# Patient Record
Sex: FEMALE | Race: WHITE | NOT HISPANIC OR LATINO | Employment: FULL TIME | URBAN - METROPOLITAN AREA
[De-identification: names, ages, dates, MRNs, and addresses within clinical notes are randomized per-mention and may not be internally consistent; named-entity substitution may affect disease eponyms.]

---

## 2021-12-10 ENCOUNTER — PRE-ADMISSION TESTING (OUTPATIENT)
Dept: PREADMISSION TESTING | Facility: HOSPITAL | Age: 56
End: 2021-12-10

## 2021-12-10 VITALS — BODY MASS INDEX: 30.43 KG/M2 | WEIGHT: 171.74 LBS | HEIGHT: 63 IN

## 2021-12-10 LAB
ANION GAP SERPL CALCULATED.3IONS-SCNC: 15 MMOL/L (ref 5–15)
BUN SERPL-MCNC: 13 MG/DL (ref 6–20)
BUN/CREAT SERPL: 19.7 (ref 7–25)
CALCIUM SPEC-SCNC: 10.8 MG/DL (ref 8.6–10.5)
CHLORIDE SERPL-SCNC: 101 MMOL/L (ref 98–107)
CO2 SERPL-SCNC: 25 MMOL/L (ref 22–29)
CREAT SERPL-MCNC: 0.66 MG/DL (ref 0.57–1)
DEPRECATED RDW RBC AUTO: 43.8 FL (ref 37–54)
ERYTHROCYTE [DISTWIDTH] IN BLOOD BY AUTOMATED COUNT: 12.7 % (ref 12.3–15.4)
GFR SERPL CREATININE-BSD FRML MDRD: 112 ML/MIN/1.73
GFR SERPL CREATININE-BSD FRML MDRD: 92 ML/MIN/1.73
GLUCOSE SERPL-MCNC: 131 MG/DL (ref 65–99)
HBA1C MFR BLD: 6.5 % (ref 4.8–5.6)
HCT VFR BLD AUTO: 46.8 % (ref 34–46.6)
HGB BLD-MCNC: 15.8 G/DL (ref 12–15.9)
MCH RBC QN AUTO: 31.6 PG (ref 26.6–33)
MCHC RBC AUTO-ENTMCNC: 33.8 G/DL (ref 31.5–35.7)
MCV RBC AUTO: 93.6 FL (ref 79–97)
PLATELET # BLD AUTO: 323 10*3/MM3 (ref 140–450)
PMV BLD AUTO: 9.2 FL (ref 6–12)
POTASSIUM SERPL-SCNC: 4.2 MMOL/L (ref 3.5–5.2)
QT INTERVAL: 414 MS
QTC INTERVAL: 462 MS
RBC # BLD AUTO: 5 10*6/MM3 (ref 3.77–5.28)
SARS-COV-2 RNA PNL SPEC NAA+PROBE: NOT DETECTED
SODIUM SERPL-SCNC: 141 MMOL/L (ref 136–145)
WBC NRBC COR # BLD: 9.96 10*3/MM3 (ref 3.4–10.8)

## 2021-12-10 PROCEDURE — U0004 COV-19 TEST NON-CDC HGH THRU: HCPCS

## 2021-12-10 PROCEDURE — 84134 ASSAY OF PREALBUMIN: CPT | Performed by: PLASTIC SURGERY

## 2021-12-10 PROCEDURE — 93005 ELECTROCARDIOGRAM TRACING: CPT

## 2021-12-10 PROCEDURE — 85027 COMPLETE CBC AUTOMATED: CPT

## 2021-12-10 PROCEDURE — C9803 HOPD COVID-19 SPEC COLLECT: HCPCS

## 2021-12-10 PROCEDURE — 83036 HEMOGLOBIN GLYCOSYLATED A1C: CPT

## 2021-12-10 PROCEDURE — 80048 BASIC METABOLIC PNL TOTAL CA: CPT

## 2021-12-10 PROCEDURE — 36415 COLL VENOUS BLD VENIPUNCTURE: CPT

## 2021-12-10 PROCEDURE — 93010 ELECTROCARDIOGRAM REPORT: CPT | Performed by: INTERNAL MEDICINE

## 2021-12-10 RX ORDER — ROSUVASTATIN CALCIUM 20 MG/1
20 TABLET, COATED ORAL DAILY
COMMUNITY

## 2021-12-10 RX ORDER — MULTIPLE VITAMINS W/ MINERALS TAB 9MG-400MCG
2 TAB ORAL DAILY
COMMUNITY

## 2021-12-10 RX ORDER — FAMOTIDINE 10 MG
10 TABLET ORAL 2 TIMES DAILY
COMMUNITY

## 2021-12-10 RX ORDER — HYDROCHLOROTHIAZIDE 25 MG/1
25 TABLET ORAL DAILY
COMMUNITY

## 2021-12-10 RX ORDER — OMEPRAZOLE 20 MG/1
20 CAPSULE, DELAYED RELEASE ORAL DAILY
Status: ON HOLD | COMMUNITY
End: 2021-12-13

## 2021-12-10 RX ORDER — AMLODIPINE BESYLATE 10 MG/1
10 TABLET ORAL DAILY
COMMUNITY

## 2021-12-10 NOTE — PAT
Patient viewed general [facility] education video as instructed in their preoperative information received from their surgeon.  Patient stated the general [facility] education video was viewed in its entirety and survey completed.  Copies of [facility] general education handouts (Incentive Spirometry, Meds to Beds Program, Patient Belongings, Pre-op skin preparation instructions, Blood Glucose testing, Visitor policy, Surgery FAQ, Code H) distributed to patient if not printed. Education related to the PAT pass and skin preparation for surgery (if applicable) completed in PAT as a reinforcement to PAT education video. Patient instructed to return PAT pass provided today as well as completed skin preparation sheet (if applicable) on the day of procedure.     Additionally if patient had not viewed video yet but intended to view it at home or in our waiting area, then referred them to the handout with QR code/link provided during PAT visit.  Instructed patient to complete survey after viewing the video in its entirety.  Encouraged patient/family to read [facility] general education handouts thoroughly and notify PAT staff with any questions or concerns. Patient verbalized understanding of all information and priority content.    Patient to apply Chlorhexadine wipes  to surgical area (as instructed) the night before procedure and the AM of procedure. Wipes provided.    Patient instructed to drink 20 ounces (or until full) of Gatorade and it needs to be completed 1 hour (for Main OR patients) or 2 hours (scheduled  section patients) before given arrival time for procedure (NO RED Gatorade)    Patient verbalized understanding.    COVID TEST PERFORMED IN [facility] TODAY

## 2021-12-11 ENCOUNTER — ANESTHESIA EVENT (OUTPATIENT)
Dept: PERIOP | Facility: HOSPITAL | Age: 56
End: 2021-12-11

## 2021-12-11 LAB — PREALB SERPL-MCNC: 32.7 MG/DL (ref 20–40)

## 2021-12-12 RX ORDER — FAMOTIDINE 10 MG/ML
20 INJECTION, SOLUTION INTRAVENOUS ONCE
Status: CANCELLED | OUTPATIENT
Start: 2021-12-12 | End: 2021-12-12

## 2021-12-13 ENCOUNTER — HOSPITAL ENCOUNTER (OUTPATIENT)
Facility: HOSPITAL | Age: 56
Discharge: HOME | End: 2021-12-14
Attending: PLASTIC SURGERY | Admitting: PLASTIC SURGERY

## 2021-12-13 ENCOUNTER — ANESTHESIA (OUTPATIENT)
Dept: PERIOP | Facility: HOSPITAL | Age: 56
End: 2021-12-13

## 2021-12-13 ENCOUNTER — ANESTHESIA EVENT CONVERTED (OUTPATIENT)
Dept: ANESTHESIOLOGY | Facility: HOSPITAL | Age: 56
End: 2021-12-13

## 2021-12-13 PROBLEM — L98.7 EXCESS SKIN OF ABDOMEN: Status: ACTIVE | Noted: 2021-12-13

## 2021-12-13 LAB
GLUCOSE BLDC GLUCOMTR-MCNC: 106 MG/DL (ref 70–130)
GLUCOSE BLDC GLUCOMTR-MCNC: 142 MG/DL (ref 70–130)
GLUCOSE BLDC GLUCOMTR-MCNC: 164 MG/DL (ref 70–130)
GLUCOSE BLDC GLUCOMTR-MCNC: 180 MG/DL (ref 70–130)

## 2021-12-13 PROCEDURE — G0378 HOSPITAL OBSERVATION PER HR: HCPCS

## 2021-12-13 PROCEDURE — 25010000002 FENTANYL CITRATE (PF) 50 MCG/ML SOLUTION

## 2021-12-13 PROCEDURE — 25010000002 ONDANSETRON PER 1 MG: Performed by: NURSE ANESTHETIST, CERTIFIED REGISTERED

## 2021-12-13 PROCEDURE — 82962 GLUCOSE BLOOD TEST: CPT

## 2021-12-13 PROCEDURE — C1889 IMPLANT/INSERT DEVICE, NOC: HCPCS | Performed by: PLASTIC SURGERY

## 2021-12-13 PROCEDURE — 63710000001 INSULIN LISPRO (HUMAN) PER 5 UNITS: Performed by: PLASTIC SURGERY

## 2021-12-13 PROCEDURE — 94799 UNLISTED PULMONARY SVC/PX: CPT

## 2021-12-13 PROCEDURE — 0 CEFAZOLIN IN DEXTROSE 2-4 GM/100ML-% SOLUTION: Performed by: PLASTIC SURGERY

## 2021-12-13 PROCEDURE — 25010000002 DEXAMETHASONE PER 1 MG: Performed by: NURSE ANESTHETIST, CERTIFIED REGISTERED

## 2021-12-13 PROCEDURE — 25010000002 ONDANSETRON PER 1 MG: Performed by: PLASTIC SURGERY

## 2021-12-13 PROCEDURE — 25010000002 DEXAMETHASONE SODIUM PHOSPHATE 10 MG/ML SOLUTION: Performed by: NURSE ANESTHETIST, CERTIFIED REGISTERED

## 2021-12-13 PROCEDURE — 25010000002 NEOSTIGMINE 10 MG/10ML SOLUTION: Performed by: NURSE ANESTHETIST, CERTIFIED REGISTERED

## 2021-12-13 PROCEDURE — 25010000002 PROPOFOL 10 MG/ML EMULSION: Performed by: NURSE ANESTHETIST, CERTIFIED REGISTERED

## 2021-12-13 PROCEDURE — 63710000001 PROMETHAZINE PER 12.5 MG: Performed by: PLASTIC SURGERY

## 2021-12-13 PROCEDURE — 25010000002 FENTANYL CITRATE (PF) 50 MCG/ML SOLUTION: Performed by: NURSE ANESTHETIST, CERTIFIED REGISTERED

## 2021-12-13 PROCEDURE — 25010000002 BUPRENORPHINE PER 0.1 MG: Performed by: NURSE ANESTHETIST, CERTIFIED REGISTERED

## 2021-12-13 PROCEDURE — 25010000002 EPINEPHRINE PER 0.1 MG: Performed by: PLASTIC SURGERY

## 2021-12-13 DEVICE — DEV CONTRL TISS STRATAFIX SPIRAL PDS PLS CT1 2-0 45CM: Type: IMPLANTABLE DEVICE | Site: ABDOMEN | Status: FUNCTIONAL

## 2021-12-13 RX ORDER — DIPHENHYDRAMINE HCL 25 MG
25 CAPSULE ORAL EVERY 6 HOURS PRN
Status: DISCONTINUED | OUTPATIENT
Start: 2021-12-13 | End: 2021-12-14 | Stop reason: HOSPADM

## 2021-12-13 RX ORDER — FENTANYL CITRATE 50 UG/ML
50 INJECTION, SOLUTION INTRAMUSCULAR; INTRAVENOUS
Status: DISCONTINUED | OUTPATIENT
Start: 2021-12-13 | End: 2021-12-13 | Stop reason: HOSPADM

## 2021-12-13 RX ORDER — SODIUM CHLORIDE, SODIUM LACTATE, POTASSIUM CHLORIDE, CALCIUM CHLORIDE 600; 310; 30; 20 MG/100ML; MG/100ML; MG/100ML; MG/100ML
75 INJECTION, SOLUTION INTRAVENOUS CONTINUOUS
Status: DISCONTINUED | OUTPATIENT
Start: 2021-12-13 | End: 2021-12-13

## 2021-12-13 RX ORDER — DEXAMETHASONE SODIUM PHOSPHATE 10 MG/ML
INJECTION, SOLUTION INTRAMUSCULAR; INTRAVENOUS
Status: COMPLETED | OUTPATIENT
Start: 2021-12-13 | End: 2021-12-13

## 2021-12-13 RX ORDER — NEOSTIGMINE METHYLSULFATE 1 MG/ML
INJECTION, SOLUTION INTRAVENOUS AS NEEDED
Status: DISCONTINUED | OUTPATIENT
Start: 2021-12-13 | End: 2021-12-13 | Stop reason: SURG

## 2021-12-13 RX ORDER — PROMETHAZINE HYDROCHLORIDE 25 MG/1
25 TABLET ORAL ONCE AS NEEDED
Status: DISCONTINUED | OUTPATIENT
Start: 2021-12-13 | End: 2021-12-13 | Stop reason: HOSPADM

## 2021-12-13 RX ORDER — DEXAMETHASONE SODIUM PHOSPHATE 4 MG/ML
INJECTION, SOLUTION INTRA-ARTICULAR; INTRALESIONAL; INTRAMUSCULAR; INTRAVENOUS; SOFT TISSUE AS NEEDED
Status: DISCONTINUED | OUTPATIENT
Start: 2021-12-13 | End: 2021-12-13 | Stop reason: SURG

## 2021-12-13 RX ORDER — DIPHENHYDRAMINE HYDROCHLORIDE 50 MG/ML
12.5 INJECTION INTRAMUSCULAR; INTRAVENOUS EVERY 6 HOURS PRN
Status: DISCONTINUED | OUTPATIENT
Start: 2021-12-13 | End: 2021-12-14 | Stop reason: HOSPADM

## 2021-12-13 RX ORDER — CEFAZOLIN SODIUM 2 G/100ML
2 INJECTION, SOLUTION INTRAVENOUS EVERY 8 HOURS
Status: DISCONTINUED | OUTPATIENT
Start: 2021-12-13 | End: 2021-12-13

## 2021-12-13 RX ORDER — DROPERIDOL 2.5 MG/ML
0.62 INJECTION, SOLUTION INTRAMUSCULAR; INTRAVENOUS ONCE AS NEEDED
Status: DISCONTINUED | OUTPATIENT
Start: 2021-12-13 | End: 2021-12-13 | Stop reason: HOSPADM

## 2021-12-13 RX ORDER — ROCURONIUM BROMIDE 10 MG/ML
INJECTION, SOLUTION INTRAVENOUS AS NEEDED
Status: DISCONTINUED | OUTPATIENT
Start: 2021-12-13 | End: 2021-12-13 | Stop reason: SURG

## 2021-12-13 RX ORDER — MULTIPLE VITAMINS W/ MINERALS TAB 9MG-400MCG
2 TAB ORAL DAILY
Status: DISCONTINUED | OUTPATIENT
Start: 2021-12-13 | End: 2021-12-14 | Stop reason: HOSPADM

## 2021-12-13 RX ORDER — FENTANYL CITRATE 50 UG/ML
INJECTION, SOLUTION INTRAMUSCULAR; INTRAVENOUS
Status: COMPLETED
Start: 2021-12-13 | End: 2021-12-13

## 2021-12-13 RX ORDER — LABETALOL HYDROCHLORIDE 5 MG/ML
INJECTION, SOLUTION INTRAVENOUS AS NEEDED
Status: DISCONTINUED | OUTPATIENT
Start: 2021-12-13 | End: 2021-12-13 | Stop reason: SURG

## 2021-12-13 RX ORDER — ROSUVASTATIN CALCIUM 20 MG/1
20 TABLET, COATED ORAL NIGHTLY
Status: DISCONTINUED | OUTPATIENT
Start: 2021-12-13 | End: 2021-12-14 | Stop reason: HOSPADM

## 2021-12-13 RX ORDER — PROMETHAZINE HYDROCHLORIDE 25 MG/1
25 SUPPOSITORY RECTAL ONCE AS NEEDED
Status: DISCONTINUED | OUTPATIENT
Start: 2021-12-13 | End: 2021-12-13 | Stop reason: HOSPADM

## 2021-12-13 RX ORDER — FENTANYL CITRATE 50 UG/ML
INJECTION, SOLUTION INTRAMUSCULAR; INTRAVENOUS AS NEEDED
Status: DISCONTINUED | OUTPATIENT
Start: 2021-12-13 | End: 2021-12-13 | Stop reason: SURG

## 2021-12-13 RX ORDER — SODIUM CHLORIDE 0.9 % (FLUSH) 0.9 %
10 SYRINGE (ML) INJECTION EVERY 12 HOURS SCHEDULED
Status: DISCONTINUED | OUTPATIENT
Start: 2021-12-13 | End: 2021-12-13 | Stop reason: HOSPADM

## 2021-12-13 RX ORDER — ACETAMINOPHEN 500 MG
1000 TABLET ORAL EVERY 6 HOURS
Status: DISCONTINUED | OUTPATIENT
Start: 2021-12-13 | End: 2021-12-14 | Stop reason: HOSPADM

## 2021-12-13 RX ORDER — HEPARIN SODIUM 5000 [USP'U]/ML
5000 INJECTION, SOLUTION INTRAVENOUS; SUBCUTANEOUS ONCE
Status: DISCONTINUED | OUTPATIENT
Start: 2021-12-13 | End: 2021-12-13 | Stop reason: HOSPADM

## 2021-12-13 RX ORDER — BUPRENORPHINE HYDROCHLORIDE 0.32 MG/ML
INJECTION INTRAMUSCULAR; INTRAVENOUS
Status: COMPLETED | OUTPATIENT
Start: 2021-12-13 | End: 2021-12-13

## 2021-12-13 RX ORDER — SODIUM CHLORIDE 9 MG/ML
75 INJECTION, SOLUTION INTRAVENOUS CONTINUOUS
Status: DISCONTINUED | OUTPATIENT
Start: 2021-12-13 | End: 2021-12-13

## 2021-12-13 RX ORDER — DEXTROSE MONOHYDRATE 25 G/50ML
25 INJECTION, SOLUTION INTRAVENOUS
Status: DISCONTINUED | OUTPATIENT
Start: 2021-12-13 | End: 2021-12-14 | Stop reason: HOSPADM

## 2021-12-13 RX ORDER — HYDROMORPHONE HYDROCHLORIDE 1 MG/ML
0.5 INJECTION, SOLUTION INTRAMUSCULAR; INTRAVENOUS; SUBCUTANEOUS
Status: DISCONTINUED | OUTPATIENT
Start: 2021-12-13 | End: 2021-12-13 | Stop reason: HOSPADM

## 2021-12-13 RX ORDER — LIDOCAINE HYDROCHLORIDE 10 MG/ML
INJECTION, SOLUTION EPIDURAL; INFILTRATION; INTRACAUDAL; PERINEURAL AS NEEDED
Status: DISCONTINUED | OUTPATIENT
Start: 2021-12-13 | End: 2021-12-13 | Stop reason: SURG

## 2021-12-13 RX ORDER — FAMOTIDINE 20 MG/1
10 TABLET, FILM COATED ORAL 2 TIMES DAILY
Status: DISCONTINUED | OUTPATIENT
Start: 2021-12-13 | End: 2021-12-14 | Stop reason: HOSPADM

## 2021-12-13 RX ORDER — CEPHALEXIN 250 MG/1
500 CAPSULE ORAL EVERY 12 HOURS SCHEDULED
Status: DISCONTINUED | OUTPATIENT
Start: 2021-12-13 | End: 2021-12-14 | Stop reason: HOSPADM

## 2021-12-13 RX ORDER — DROPERIDOL 2.5 MG/ML
0.62 INJECTION, SOLUTION INTRAMUSCULAR; INTRAVENOUS AS NEEDED
Status: DISCONTINUED | OUTPATIENT
Start: 2021-12-13 | End: 2021-12-13 | Stop reason: HOSPADM

## 2021-12-13 RX ORDER — HYDROCHLOROTHIAZIDE 25 MG/1
25 TABLET ORAL DAILY
Status: DISCONTINUED | OUTPATIENT
Start: 2021-12-13 | End: 2021-12-14 | Stop reason: HOSPADM

## 2021-12-13 RX ORDER — OXYCODONE HYDROCHLORIDE 5 MG/1
5 TABLET ORAL EVERY 4 HOURS PRN
Status: DISCONTINUED | OUTPATIENT
Start: 2021-12-13 | End: 2021-12-14 | Stop reason: HOSPADM

## 2021-12-13 RX ORDER — MIDAZOLAM HYDROCHLORIDE 1 MG/ML
1 INJECTION INTRAMUSCULAR; INTRAVENOUS
Status: DISCONTINUED | OUTPATIENT
Start: 2021-12-13 | End: 2021-12-13 | Stop reason: HOSPADM

## 2021-12-13 RX ORDER — VALSARTAN 160 MG/1
320 TABLET ORAL
Status: DISCONTINUED | OUTPATIENT
Start: 2021-12-13 | End: 2021-12-14 | Stop reason: HOSPADM

## 2021-12-13 RX ORDER — PROPOFOL 10 MG/ML
VIAL (ML) INTRAVENOUS AS NEEDED
Status: DISCONTINUED | OUTPATIENT
Start: 2021-12-13 | End: 2021-12-13 | Stop reason: SURG

## 2021-12-13 RX ORDER — ONDANSETRON 2 MG/ML
4 INJECTION INTRAMUSCULAR; INTRAVENOUS ONCE AS NEEDED
Status: DISCONTINUED | OUTPATIENT
Start: 2021-12-13 | End: 2021-12-13 | Stop reason: HOSPADM

## 2021-12-13 RX ORDER — IPRATROPIUM BROMIDE AND ALBUTEROL SULFATE 2.5; .5 MG/3ML; MG/3ML
3 SOLUTION RESPIRATORY (INHALATION) ONCE AS NEEDED
Status: DISCONTINUED | OUTPATIENT
Start: 2021-12-13 | End: 2021-12-13 | Stop reason: HOSPADM

## 2021-12-13 RX ORDER — SODIUM CHLORIDE 0.9 % (FLUSH) 0.9 %
3 SYRINGE (ML) INJECTION EVERY 12 HOURS SCHEDULED
Status: DISCONTINUED | OUTPATIENT
Start: 2021-12-13 | End: 2021-12-13 | Stop reason: HOSPADM

## 2021-12-13 RX ORDER — HYDROCODONE BITARTRATE AND ACETAMINOPHEN 5; 325 MG/1; MG/1
1 TABLET ORAL ONCE AS NEEDED
Status: DISCONTINUED | OUTPATIENT
Start: 2021-12-13 | End: 2021-12-13 | Stop reason: HOSPADM

## 2021-12-13 RX ORDER — NALOXONE HCL 0.4 MG/ML
0.4 VIAL (ML) INJECTION AS NEEDED
Status: DISCONTINUED | OUTPATIENT
Start: 2021-12-13 | End: 2021-12-13 | Stop reason: HOSPADM

## 2021-12-13 RX ORDER — MEPERIDINE HYDROCHLORIDE 25 MG/ML
12.5 INJECTION INTRAMUSCULAR; INTRAVENOUS; SUBCUTANEOUS
Status: DISCONTINUED | OUTPATIENT
Start: 2021-12-13 | End: 2021-12-13 | Stop reason: HOSPADM

## 2021-12-13 RX ORDER — HYDRALAZINE HYDROCHLORIDE 20 MG/ML
5 INJECTION INTRAMUSCULAR; INTRAVENOUS
Status: DISCONTINUED | OUTPATIENT
Start: 2021-12-13 | End: 2021-12-13 | Stop reason: HOSPADM

## 2021-12-13 RX ORDER — ONDANSETRON 2 MG/ML
INJECTION INTRAMUSCULAR; INTRAVENOUS AS NEEDED
Status: DISCONTINUED | OUTPATIENT
Start: 2021-12-13 | End: 2021-12-13 | Stop reason: SURG

## 2021-12-13 RX ORDER — SODIUM CHLORIDE, SODIUM LACTATE, POTASSIUM CHLORIDE, CALCIUM CHLORIDE 600; 310; 30; 20 MG/100ML; MG/100ML; MG/100ML; MG/100ML
9 INJECTION, SOLUTION INTRAVENOUS CONTINUOUS
Status: DISCONTINUED | OUTPATIENT
Start: 2021-12-13 | End: 2021-12-14 | Stop reason: HOSPADM

## 2021-12-13 RX ORDER — PROMETHAZINE HYDROCHLORIDE 12.5 MG/1
6.25 TABLET ORAL EVERY 6 HOURS PRN
Status: DISCONTINUED | OUTPATIENT
Start: 2021-12-13 | End: 2021-12-14 | Stop reason: HOSPADM

## 2021-12-13 RX ORDER — GLYCOPYRROLATE 0.2 MG/ML
INJECTION INTRAMUSCULAR; INTRAVENOUS AS NEEDED
Status: DISCONTINUED | OUTPATIENT
Start: 2021-12-13 | End: 2021-12-13 | Stop reason: SURG

## 2021-12-13 RX ORDER — SODIUM CHLORIDE 0.9 % (FLUSH) 0.9 %
10 SYRINGE (ML) INJECTION AS NEEDED
Status: DISCONTINUED | OUTPATIENT
Start: 2021-12-13 | End: 2021-12-13 | Stop reason: HOSPADM

## 2021-12-13 RX ORDER — ONDANSETRON 2 MG/ML
4 INJECTION INTRAMUSCULAR; INTRAVENOUS EVERY 6 HOURS PRN
Status: DISCONTINUED | OUTPATIENT
Start: 2021-12-13 | End: 2021-12-14 | Stop reason: HOSPADM

## 2021-12-13 RX ORDER — AMLODIPINE BESYLATE 10 MG/1
10 TABLET ORAL DAILY
Status: DISCONTINUED | OUTPATIENT
Start: 2021-12-14 | End: 2021-12-14 | Stop reason: HOSPADM

## 2021-12-13 RX ORDER — NICOTINE POLACRILEX 4 MG
15 LOZENGE BUCCAL
Status: DISCONTINUED | OUTPATIENT
Start: 2021-12-13 | End: 2021-12-14 | Stop reason: HOSPADM

## 2021-12-13 RX ORDER — SODIUM CHLORIDE, SODIUM LACTATE, POTASSIUM CHLORIDE, CALCIUM CHLORIDE 600; 310; 30; 20 MG/100ML; MG/100ML; MG/100ML; MG/100ML
1000 INJECTION, SOLUTION INTRAVENOUS CONTINUOUS
Status: DISCONTINUED | OUTPATIENT
Start: 2021-12-13 | End: 2021-12-14 | Stop reason: HOSPADM

## 2021-12-13 RX ORDER — PROMETHAZINE HYDROCHLORIDE 12.5 MG/1
6.25 SUPPOSITORY RECTAL EVERY 6 HOURS PRN
Status: DISCONTINUED | OUTPATIENT
Start: 2021-12-13 | End: 2021-12-14 | Stop reason: HOSPADM

## 2021-12-13 RX ORDER — BUPIVACAINE HYDROCHLORIDE 2.5 MG/ML
INJECTION, SOLUTION EPIDURAL; INFILTRATION; INTRACAUDAL
Status: COMPLETED | OUTPATIENT
Start: 2021-12-13 | End: 2021-12-13

## 2021-12-13 RX ORDER — SODIUM CHLORIDE 0.9 % (FLUSH) 0.9 %
3-10 SYRINGE (ML) INJECTION AS NEEDED
Status: DISCONTINUED | OUTPATIENT
Start: 2021-12-13 | End: 2021-12-13 | Stop reason: HOSPADM

## 2021-12-13 RX ORDER — LABETALOL HYDROCHLORIDE 5 MG/ML
5 INJECTION, SOLUTION INTRAVENOUS
Status: DISCONTINUED | OUTPATIENT
Start: 2021-12-13 | End: 2021-12-13 | Stop reason: HOSPADM

## 2021-12-13 RX ORDER — LIDOCAINE HYDROCHLORIDE 10 MG/ML
0.5 INJECTION, SOLUTION EPIDURAL; INFILTRATION; INTRACAUDAL; PERINEURAL ONCE AS NEEDED
Status: COMPLETED | OUTPATIENT
Start: 2021-12-13 | End: 2021-12-13

## 2021-12-13 RX ORDER — FAMOTIDINE 20 MG/1
20 TABLET, FILM COATED ORAL ONCE
Status: COMPLETED | OUTPATIENT
Start: 2021-12-13 | End: 2021-12-13

## 2021-12-13 RX ORDER — CEFAZOLIN SODIUM 2 G/100ML
2 INJECTION, SOLUTION INTRAVENOUS ONCE
Status: COMPLETED | OUTPATIENT
Start: 2021-12-13 | End: 2021-12-13

## 2021-12-13 RX ORDER — CYCLOBENZAPRINE HCL 10 MG
10 TABLET ORAL EVERY 8 HOURS PRN
Status: DISCONTINUED | OUTPATIENT
Start: 2021-12-13 | End: 2021-12-14 | Stop reason: HOSPADM

## 2021-12-13 RX ADMIN — SODIUM CHLORIDE 75 ML/HR: 9 INJECTION, SOLUTION INTRAVENOUS at 13:16

## 2021-12-13 RX ADMIN — PROPOFOL 160 MG: 10 INJECTION, EMULSION INTRAVENOUS at 08:49

## 2021-12-13 RX ADMIN — FAMOTIDINE 10 MG: 20 TABLET, FILM COATED ORAL at 20:22

## 2021-12-13 RX ADMIN — SODIUM CHLORIDE, POTASSIUM CHLORIDE, SODIUM LACTATE AND CALCIUM CHLORIDE 9 ML/HR: 600; 310; 30; 20 INJECTION, SOLUTION INTRAVENOUS at 07:54

## 2021-12-13 RX ADMIN — BUPRENORPHINE HYDROCHLORIDE 0.3 MG: 0.32 INJECTION INTRAMUSCULAR; INTRAVENOUS at 08:50

## 2021-12-13 RX ADMIN — INSULIN LISPRO 2 UNITS: 100 INJECTION, SOLUTION INTRAVENOUS; SUBCUTANEOUS at 16:57

## 2021-12-13 RX ADMIN — FENTANYL CITRATE 50 MCG: 50 INJECTION, SOLUTION INTRAMUSCULAR; INTRAVENOUS at 12:18

## 2021-12-13 RX ADMIN — FENTANYL CITRATE 50 MCG: 50 INJECTION, SOLUTION INTRAMUSCULAR; INTRAVENOUS at 08:53

## 2021-12-13 RX ADMIN — FENTANYL CITRATE 50 MCG: 50 INJECTION, SOLUTION INTRAMUSCULAR; INTRAVENOUS at 08:49

## 2021-12-13 RX ADMIN — LIDOCAINE HYDROCHLORIDE 0.2 ML: 10 INJECTION, SOLUTION EPIDURAL; INFILTRATION; INTRACAUDAL; PERINEURAL at 07:54

## 2021-12-13 RX ADMIN — ONDANSETRON 4 MG: 2 INJECTION INTRAMUSCULAR; INTRAVENOUS at 16:57

## 2021-12-13 RX ADMIN — FAMOTIDINE 10 MG: 20 TABLET, FILM COATED ORAL at 14:07

## 2021-12-13 RX ADMIN — VALSARTAN 320 MG: 160 TABLET, FILM COATED ORAL at 15:31

## 2021-12-13 RX ADMIN — OXYCODONE 5 MG: 5 TABLET ORAL at 18:00

## 2021-12-13 RX ADMIN — DEXAMETHASONE SODIUM PHOSPHATE 4 MG: 10 INJECTION, SOLUTION INTRAMUSCULAR; INTRAVENOUS at 08:50

## 2021-12-13 RX ADMIN — FAMOTIDINE 20 MG: 20 TABLET, FILM COATED ORAL at 08:00

## 2021-12-13 RX ADMIN — ACETAMINOPHEN 1000 MG: 500 TABLET ORAL at 14:07

## 2021-12-13 RX ADMIN — DEXAMETHASONE SODIUM PHOSPHATE 4 MG: 4 INJECTION, SOLUTION INTRA-ARTICULAR; INTRALESIONAL; INTRAMUSCULAR; INTRAVENOUS; SOFT TISSUE at 08:49

## 2021-12-13 RX ADMIN — SODIUM CHLORIDE, POTASSIUM CHLORIDE, SODIUM LACTATE AND CALCIUM CHLORIDE 1000 ML: 600; 310; 30; 20 INJECTION, SOLUTION INTRAVENOUS at 12:38

## 2021-12-13 RX ADMIN — CEPHALEXIN 500 MG: 250 CAPSULE ORAL at 22:14

## 2021-12-13 RX ADMIN — ONDANSETRON 4 MG: 2 INJECTION INTRAMUSCULAR; INTRAVENOUS at 11:21

## 2021-12-13 RX ADMIN — HYDROCHLOROTHIAZIDE 25 MG: 25 TABLET ORAL at 14:07

## 2021-12-13 RX ADMIN — ROSUVASTATIN CALCIUM 20 MG: 20 TABLET, COATED ORAL at 20:22

## 2021-12-13 RX ADMIN — ACETAMINOPHEN 1000 MG: 500 TABLET ORAL at 20:22

## 2021-12-13 RX ADMIN — GLYCOPYRROLATE 0.4 MG: 0.2 INJECTION INTRAMUSCULAR; INTRAVENOUS at 11:47

## 2021-12-13 RX ADMIN — NEOSTIGMINE METHYLSULFATE 2 MG: 0.5 INJECTION INTRAVENOUS at 11:47

## 2021-12-13 RX ADMIN — SODIUM CHLORIDE, POTASSIUM CHLORIDE, SODIUM LACTATE AND CALCIUM CHLORIDE 75 ML/HR: 600; 310; 30; 20 INJECTION, SOLUTION INTRAVENOUS at 15:35

## 2021-12-13 RX ADMIN — PROMETHAZINE HYDROCHLORIDE 6.25 MG: 12.5 TABLET ORAL at 20:31

## 2021-12-13 RX ADMIN — BUPIVACAINE HYDROCHLORIDE 60 ML: 2.5 INJECTION, SOLUTION EPIDURAL; INFILTRATION; INTRACAUDAL; PERINEURAL at 08:50

## 2021-12-13 RX ADMIN — MULTIPLE VITAMINS W/ MINERALS TAB 2 TABLET: TAB at 14:07

## 2021-12-13 RX ADMIN — ROCURONIUM BROMIDE 45 MG: 10 INJECTION, SOLUTION INTRAVENOUS at 08:49

## 2021-12-13 RX ADMIN — LIDOCAINE HYDROCHLORIDE 40 MG: 10 INJECTION, SOLUTION EPIDURAL; INFILTRATION; INTRACAUDAL; PERINEURAL at 08:49

## 2021-12-13 RX ADMIN — CEFAZOLIN SODIUM 2 G: 2 INJECTION, SOLUTION INTRAVENOUS at 08:55

## 2021-12-13 RX ADMIN — LABETALOL HYDROCHLORIDE 2.5 MG: 5 INJECTION, SOLUTION INTRAVENOUS at 09:36

## 2021-12-13 RX ADMIN — CEFAZOLIN SODIUM 2 G: 2 INJECTION, SOLUTION INTRAVENOUS at 15:31

## 2021-12-13 NOTE — ANESTHESIA PROCEDURE NOTES
Peripheral Block      Patient reassessed immediately prior to procedure    Patient location during procedure: OR  Reason for block: at surgeon's request and post-op pain management  Performed by  CRNA: Marty Kc CRNA  Preanesthetic Checklist  Completed: patient identified, IV checked, site marked, risks and benefits discussed, surgical consent, monitors and equipment checked, pre-op evaluation and timeout performed  Prep:  Pt Position: supine  Sterile barriers:cap, gloves, sterile barriers and mask  Prep: ChloraPrep  Patient monitoring: blood pressure monitoring, continuous pulse oximetry and EKG  Procedure    Sedation: yes  Performed under: general  Guidance:ultrasound guided  Images:still images obtained, printed/placed on chart    Laterality:Bilateral  Block Type:TAP  Injection Technique:single-shot  Needle Type:short-bevel and echogenic  Needle Gauge:20 G  Resistance on Injection: none    Medications Used: buprenorphine (BUPRENEX) injection, 0.3 mg  dexamethasone sodium phosphate injection, 4 mg  bupivacaine PF (MARCAINE) 0.25 % injection, 60 mL  Med administered at 12/13/2021 8:50 AM      Medications  Comment:Block Injection:  LA dose divided between Right and Left block        Post Assessment  Injection Assessment: negative aspiration for heme, incremental injection and no paresthesia on injection  Patient Tolerance:comfortable throughout block  Complications:no  Additional Notes      Under Ultrasound guidance, a BBraun 4inch 360 degree needle was advanced with Normal Saline hydro dissection of tissue.  The Internal Oblique and Transversus Abdominus muscles where visualized.  At or before the aponeurosis of Internal Oblique, local anesthetic spread was visualized in the Transversus Abdominus Plane. Injection was made incrementally with aspiration every 5 mls.  There was no  intravascular injection,  injection pressure was normal, there was no neural injection, and the procedure was completed without  difficulty.  Thank You.

## 2021-12-13 NOTE — ANESTHESIA POSTPROCEDURE EVALUATION
Patient: Suze MCCOLLUM Shaver    Procedure Summary     Date: 12/13/21 Room / Location:  [facility]PE OR 06 / [facility] OR    Anesthesia Start: 0845 Anesthesia Stop: 1213    Procedure: ABDOMINOPLASTY WITH LIPOSUCTION (N/A Abdomen) Diagnosis:     Surgeons: Daniel Buckner MD Provider: Lane Gallagher MD    Anesthesia Type: general ASA Status: 3          Anesthesia Type: general    Vitals  No vitals data found for the desired time range.          Post Anesthesia Care and Evaluation    Patient location during evaluation: PACU  Patient participation: complete - patient participated  Level of consciousness: sleepy but conscious  Pain management: adequate  Airway patency: patent  Anesthetic complications: No anesthetic complications  PONV Status: none  Cardiovascular status: hemodynamically stable and acceptable  Respiratory status: nonlabored ventilation, acceptable and nasal cannula  Hydration status: acceptable

## 2021-12-13 NOTE — OP NOTE
Patient Name:  Suze Vaca  :  1965  MRN:  [id number]    Date of Surgery:  2021       Pre Operative Diagnosis: Excess abdominal skin  Post Operative Diagnosis:   Excess abdominal skin    PROCEDURES PERFORMED:  1) abdominoplasty     SURGEON: Daniel Buckner MD    Staff:  Surgeon(s):  Ludwig Sanchez MD Moore, Evan M, MD    Circulator: Manoj Velasquez RN; Belkys Oakley RN; Dianna Taylor RN  Scrub Person: Melissa Aburto  Nursing Assistant: Gorge Clinton     Anesthesia: General    Indications for the Procedure:  55yoF  hx DM of bilateral breast reduction with excess abdominal skin    Operative Findings:  Excess abdominal skin     Description of the Procedure:     Description of Procedure    Patient was seen in preop risks and benefits were discussed with the patient who elected to proceed with the procedure. H&P was updated, consent was obtained, and patient was marked appropriately. Patient was then taken back to the OR placed in a supine position. Anesthesia was induced and the patient was then intubated. The patient was the prepped and draped in a sterile fashion. A time out was performed in which the patient and the procedure were gone over. Everyone was in agreement in terms of the patient and the procedure.      A marker was used to draw out the planned lower abdominal incision 6 cm superior to the vaginal vestibule. A ruler was used to check symmetry. Local was then placed along the incision. A 10 blade was used to score the incision followed by bovie cautery down to the rectus fascia. Dissection continued in a superior direction up to the umbilicus. At the umbilicus a marker was used to ignacio out the planned umbilicus. A 11 blade was used to outline the umbilicus. Tenotomy scissors were used to better define the umbilicus. A 10-blade followed by bovie cautery was used to split the flap. Dissection continued around the umbilicus and up centrally to the xiphoid. The  abdomen was irrigated, and hemostasis noted. The abdominal plication was marked with a marker. 0 ethibond figure of 8 stitches were used to plicate followed by 1-0 strattafix suture. The mons was then tacked into position with 2-0 vicryl suture. The patient was then placed in beach chair flexed position. Progressive tension sutures were then placed with 2-0 vicryl. Two drains were then placed and the drains were suture into position with 3-0 nylons. The flaps were brought down and marked for excision. Local was placed along the incision. The planned incision was scored using a 10 blade followed by bovie cautery. The skin flaps were then excised. Hemostasis was noted. The skin flaps were then tailored tacked into position using a stapler. The incision was closed with deep 2-0 vicryl suture followed by In-sorb stapler and a running 3-0 strattafix suture. A marker was used prior to closure to ignacio the new umbilicus position. A 15 blade was used to make an ellipse incision where the new umbilicus has been marked. Tenotomy scissor were then used to dissect out the umbilicus. The umbilicus was pulled into position. 3-0 monocryls were then used to tack the umbilicus into position followed by 5-0 monocryl suture.     Xeofin was then placed along the incision. Dressing followed by an abdominal binder were placed.      At this point in time the procedure had finished. Patient was then extubated and taken to PACU for full recovery.       Implants:    Implant Name Type Inv. Item Serial No.  Lot No. LRB No. Used Action   DEV CONTRL TISS STRATAFIX SPIRAL PDS PLS CT1 2-0 45CM - [id number] Implant DEV CONTRL TISS STRATAFIX SPIRAL PDS PLS CT1 2-0 45CM  [employer]  DIV OF J [employer] J RHMEDE N/A 1 Implanted   DEV CONTRL TISS STRATAFIX SPIRAL PDS PLS CT1 2-0 45CM - [id number] Implant DEV CONTRL TISS STRATAFIX SPIRAL PDS PLS CT1 2-0 45CM  [employer]  DIV OF J [employer] J RHMEDE N/A 1 Implanted       Specimen  Removed: excess abdominal skin     Estimated Blood Loss: 150 cc    Drains Placed: two drains placed    Complications: none immediate      Daniel Buckner MD     Date: 12/13/2021  Time: 12:19 EST

## 2021-12-13 NOTE — PLAN OF CARE
Goal Outcome Evaluation:               VSS, alert oriented x4, tolerating PO medications and food. Up to the bathroom one assist. TIARRA drain x2 draining well. Abdominal binder in place CDI.

## 2021-12-13 NOTE — ANESTHESIA PREPROCEDURE EVALUATION
Anesthesia Evaluation                  Airway   Mallampati: II  Dental      Pulmonary    Cardiovascular     (+) hypertension,       Neuro/Psych  GI/Hepatic/Renal/Endo    (+)  GERD,  diabetes mellitus,     Musculoskeletal     Abdominal    Substance History      OB/GYN          Other                      Anesthesia Plan    ASA 3     general     intravenous induction     Anesthetic plan, all risks, benefits, and alternatives have been provided, discussed and informed consent has been obtained with: patient.

## 2021-12-13 NOTE — ANESTHESIA PROCEDURE NOTES
Airway  Urgency: elective    Date/Time: 12/13/2021 8:51 AM  Airway not difficult    General Information and Staff    Patient location during procedure: OR  CRNA: Mathew Romero CRNA    Indications and Patient Condition  Indications for airway management: airway protection    Preoxygenated: yes  MILS not maintained throughout  Mask difficulty assessment: 1 - vent by mask    Final Airway Details  Final airway type: endotracheal airway      Successful airway: ETT  Cuffed: yes   Successful intubation technique: direct laryngoscopy  Facilitating devices/methods: intubating stylet  Endotracheal tube insertion site: oral  Blade: Zaynab  Blade size: 3  ETT size (mm): 7.0  Cormack-Lehane Classification: grade IIa - partial view of glottis  Placement verified by: chest auscultation and capnometry   Cuff volume (mL): 6  Measured from: lips  ETT/EBT  to lips (cm): 20  Number of attempts at approach: 1  Assessment: lips, teeth, and gum same as pre-op and atraumatic intubation    Additional Comments  Negative epigastric sounds, Breath sound equal bilaterally with symmetric chest rise and fall

## 2021-12-14 VITALS
RESPIRATION RATE: 18 BRPM | SYSTOLIC BLOOD PRESSURE: 143 MMHG | DIASTOLIC BLOOD PRESSURE: 50 MMHG | OXYGEN SATURATION: 97 % | HEART RATE: 59 BPM | BODY MASS INDEX: 30.3 KG/M2 | HEIGHT: 63 IN | TEMPERATURE: 98.1 F | WEIGHT: 171 LBS

## 2021-12-14 LAB
GLUCOSE BLDC GLUCOMTR-MCNC: 125 MG/DL (ref 70–130)
GLUCOSE BLDC GLUCOMTR-MCNC: 92 MG/DL (ref 70–130)

## 2021-12-14 PROCEDURE — G0378 HOSPITAL OBSERVATION PER HR: HCPCS

## 2021-12-14 PROCEDURE — 82962 GLUCOSE BLOOD TEST: CPT

## 2021-12-14 RX ADMIN — AMLODIPINE BESYLATE 10 MG: 10 TABLET ORAL at 09:20

## 2021-12-14 RX ADMIN — OXYCODONE 5 MG: 5 TABLET ORAL at 14:42

## 2021-12-14 RX ADMIN — VALSARTAN 320 MG: 160 TABLET, FILM COATED ORAL at 09:19

## 2021-12-14 RX ADMIN — ACETAMINOPHEN 1000 MG: 500 TABLET ORAL at 03:26

## 2021-12-14 RX ADMIN — FAMOTIDINE 10 MG: 20 TABLET, FILM COATED ORAL at 09:19

## 2021-12-14 RX ADMIN — ACETAMINOPHEN 1000 MG: 500 TABLET ORAL at 09:19

## 2021-12-14 RX ADMIN — MULTIPLE VITAMINS W/ MINERALS TAB 2 TABLET: TAB at 09:19

## 2021-12-14 RX ADMIN — ACETAMINOPHEN 1000 MG: 500 TABLET ORAL at 14:42

## 2021-12-14 RX ADMIN — CEPHALEXIN 500 MG: 250 CAPSULE ORAL at 09:20

## 2021-12-14 RX ADMIN — HYDROCHLOROTHIAZIDE 25 MG: 25 TABLET ORAL at 09:19

## 2021-12-14 NOTE — PLAN OF CARE
Goal Outcome Evaluation:  Plan of Care Reviewed With: patient        Progress: no change  Outcome Summary: Patient used incentive spirometer while awake. One complaint of nausea. No complaints of pain up to this point. Patient has slept well through the night. VSS and UOP adequate. Will continue to monitorl.

## 2021-12-14 NOTE — DISCHARGE INSTRUCTIONS
- no acute surgery at this point in time  - no heavy lifting  - may shower no baths  - continued abdominal binder  - ambulate  - continue incentive spirometer  - scripts at home  - discharge later today  - follow up with Dr. Daniel Buckner 12.21.21 at 10:15 AM scheduled  - scripts at home  - start arixtra today

## 2021-12-14 NOTE — DISCHARGE SUMMARY
HPI:  Suze Vaca is a 55yoF  hx of bilateral breast reduction with excess abdominal skin. Patient states a recent hx of 40 lbs weight loss. Patient states she feels like she is at her ideal body weight where she would like to be. She states she has excess abdominal skin she would like to get rid of. Patient states hx of DM controlled her last A1c was 6.6. Patient has had two children and one still birth. Patient states no abdominal surgeries or c sections. Patient states she would like to have the abdominoplasty. Patient states no hernia or abdominal trauma. Patient states no personal or family history of blood clots or bleeding disorders. Patient states she does not have plans to lose further weight. Patient was seen in clinic and followed. Patient was eventually a candidate for an abdominoplasty and was scheduled.     Procedure 21  Abdominoplasty    Hospital Course.   Patient was admitted for observation. Patient did well overnight pain controlled, voiding, ambulating. Patient was seen in the morning and found appropriate for discharge

## 2021-12-14 NOTE — PROGRESS NOTES
Plastic Surgery Progress Note      Admission Date:  2021  LOS:  0  Patient Care Team:  Martha Austin MD as PCP - General (Internal Medicine)    Patient Name:  Suze Vaca  :  1965  MRN:  [id number]    Date:  2021        Subjective:    VSS afebrile overnight, voiding, ambulating with pain controlled.     History:   Past Medical History:   Diagnosis Date   • Diabetes mellitus (HCC)    • GERD (gastroesophageal reflux disease)    • Hyperlipidemia    • Hypertension    • Wears glasses      Past Surgical History:   Procedure Laterality Date   • REDUCTION MAMMAPLASTY Bilateral    • VAGINAL DELIVERY       No family history on file.  Social History     Socioeconomic History   • Marital status:    Tobacco Use   • Smoking status: Never Smoker   • Smokeless tobacco: Never Used   Vaping Use   • Vaping Use: Never used   Substance and Sexual Activity   • Alcohol use: Yes     Alcohol/week: 1.0 standard drink     Types: 1 Glasses of wine per week     Comment: SOCAILLY   • Drug use: Never     No Known Allergies    Medication:    Current Facility-Administered Medications:   •  acetaminophen (TYLENOL) tablet 1,000 mg, 1,000 mg, Oral, Q6H, Daniel Buckner MD, 1,000 mg at 21 0326  •  amLODIPine (NORVASC) tablet 10 mg, 10 mg, Oral, Daily, Daniel Buckner MD  •  cephalexin (KEFLEX) capsule 500 mg, 500 mg, Oral, Q12H, Daniel Buckner MD, 500 mg at 21 2214  •  cyclobenzaprine (FLEXERIL) tablet 10 mg, 10 mg, Oral, Q8H PRN, Daniel Buckner MD  •  dextrose (D50W) (25 g/50 mL) IV injection 25 g, 25 g, Intravenous, Q15 Min PRN, Daniel Buckner MD  •  dextrose (GLUTOSE) oral gel 15 g, 15 g, Oral, Q15 Min PRN, Daniel Buckner MD  •  diphenhydrAMINE (BENADRYL) capsule 25 mg, 25 mg, Oral, Q6H PRN, Daniel Buckner MD  •  diphenhydrAMINE (BENADRYL) injection 12.5 mg, 12.5 mg, Intravenous, Q6H PRN, Daniel Buckner MD  •  famotidine (PEPCID) tablet 10 mg, 10 mg, Oral, BID, Daniel Buckner MD, 10 mg at  21  •  glucagon (human recombinant) (GLUCAGEN DIAGNOSTIC) injection 1 mg, 1 mg, Subcutaneous, Q15 Min PRN, Daniel Buckner MD  •  hydroCHLOROthiazide (HYDRODIURIL) tablet 25 mg, 25 mg, Oral, Daily, Daniel Buckner MD, 25 mg at 21  •  insulin lispro (humaLOG) injection 0-9 Units, 0-9 Units, Subcutaneous, TID AC, Daniel Buckner MD, 2 Units at 21 165  •  lactated ringers infusion 1,000 mL, 1,000 mL, Intravenous, Continuous, Daniel Buckner MD, 1,000 mL at 21 1238  •  lactated ringers infusion, 9 mL/hr, Intravenous, Continuous, Lane Gallagher MD, Stopped at 21 140  •  multivitamin with minerals 2 tablet, 2 tablet, Oral, Daily, Daniel Buckner MD, 2 tablet at 21 140  •  ondansetron (ZOFRAN) injection 4 mg, 4 mg, Intravenous, Q6H PRN, Daniel Buckner MD, 4 mg at 21  •  oxyCODONE (ROXICODONE) immediate release tablet 5 mg, 5 mg, Oral, Q4H PRN, Daniel Buckner MD, 5 mg at 21 1800  •  promethazine (PHENERGAN) tablet 6.25 mg, 6.25 mg, Oral, Q6H PRN, 6.25 mg at 21 **OR** promethazine (PHENERGAN) suppository 6.25 mg, 6.25 mg, Rectal, Q6H PRN, Daniel Buckner MD  •  rosuvastatin (CRESTOR) tablet 20 mg, 20 mg, Oral, Nightly, Daniel Bucknre MD, 20 mg at 21  •  valsartan (DIOVAN) tablet 320 mg, 320 mg, Oral, Q24H, Daniel Buckner MD, 320 mg at 21 1531    Antibiotics:  Anti-Infectives (From admission, onward)    Ordered     Dose/Rate Route Frequency Start Stop    21  cephalexin (KEFLEX) capsule 500 mg        Ordering Provider: Daniel Buckner MD    500 mg Oral Every 12 Hours Scheduled 12/13/21 2115 12/15/21 2059    12/13/21 0725  ceFAZolin in dextrose (ANCEF) IVPB solution 2 g        Ordering Provider: Daniel Buckner MD    2 g  over 30 Minutes Intravenous Once 21 0855            Objective:    Physical Exam:   Vital Signs:  Temp (24hrs), Av.6 °F (37 °C), Min:97.8 °F (36.6 °C), Max:99.9 °F (37.7 °C)    Temp  Min:  97.8 °F (36.6 °C)  Max: 99.9 °F (37.7 °C)  BP  Min: 116/63  Max: 161/94  Pulse  Min: 70  Max: 90  Resp  Min: 16  Max: 18  SpO2  Min: 92 %  Max: 100 %    GENERAL: Awake and alert, in no acute distress.   LYMPH: No cervical, axillary or inguinal lymphadenopathy.  HEART: RRR;   LUNGS: Clear to auscultation bilaterally, Normal respiratory effort. Nonlabored. No dullness.  ABDOMEN: Soft, nontender, nondistended. No rebound or guarding. NO mass or HSM. Incision C/D/I no pus or drainage, drains in place  EXT:  No cyanosis, clubbing or edema. No cord.        Laboratory Data:  Results from last 7 days   Lab Units 12/10/21  1624   WBC 10*3/mm3 9.96   HEMOGLOBIN g/dL 15.8   HEMATOCRIT % 46.8*   PLATELETS 10*3/mm3 323     Results from last 7 days   Lab Units 12/10/21  1624   SODIUM mmol/L 141   POTASSIUM mmol/L 4.2   CHLORIDE mmol/L 101   CO2 mmol/L 25.0   BUN mg/dL 13   CREATININE mg/dL 0.66   GLUCOSE mg/dL 131*   CALCIUM mg/dL 10.8*                             Estimated Creatinine Clearance: 92.8 mL/min (by C-G formula based on SCr of 0.66 mg/dL).    Microbiology:  No results found for: ACANTHNAEG, AFBCX, BPERTUSSISCX, BLOODCX  No results found for: BCIDPCR, CXREFLEX, CSFCX, CULTURETIS  No results found for: CULTURES, HSVCX, URCX  No results found for: EYECULTURE, GCCX, HSVCULTURE, LABHSV  No results found for: LEGIONELLA, MRSACX, MUMPSCX, MYCOPLASCX  No results found for: NOCARDIACX, STOOLCX  No results found for: THROATCX, UNSTIMCULT, URINECX, CULTURE, VZVCULTUR  No results found for: VIRALCULTU, WOUNDCX      Assessment: 55yoF  hx of bilateral breast reduction with excess abdominal skin s/p 21 abdominoplasty      Plan:  - no acute surgery at this point in time  - no heavy lifting  - may shower no baths  - continued abdominal binder  - ambulate  - continue incentive spirometer  - scripts at home  - discharge later today          Daniel Buckner MD  21  06:52 EST

## 2021-12-14 NOTE — CASE MANAGEMENT/SOCIAL WORK
Case Management Discharge Note      Final Note: The patient will be discharged from the hospital on 12/14/2021.         Selected Continued Care - Admitted Since 12/13/2021     Destination    No services have been selected for the patient.              Durable Medical Equipment    No services have been selected for the patient.              Dialysis/Infusion    No services have been selected for the patient.              Home Medical Care    No services have been selected for the patient.              Therapy    No services have been selected for the patient.              Community Resources    No services have been selected for the patient.              Community & DME    No services have been selected for the patient.                  Transportation Services  Private: Car    Final Discharge Disposition Code: 01 - home or self-care

## 2022-01-11 ENCOUNTER — APPOINTMENT (OUTPATIENT)
Dept: CT IMAGING | Facility: HOSPITAL | Age: 57
End: 2022-01-11

## 2022-01-11 ENCOUNTER — APPOINTMENT (OUTPATIENT)
Dept: GENERAL RADIOLOGY | Facility: HOSPITAL | Age: 57
End: 2022-01-11

## 2022-01-11 ENCOUNTER — HOSPITAL ENCOUNTER (INPATIENT)
Facility: HOSPITAL | Age: 57
LOS: 3 days | Discharge: HOME | End: 2022-01-14
Attending: EMERGENCY MEDICINE | Admitting: FAMILY MEDICINE

## 2022-01-11 DIAGNOSIS — L03.311 CELLULITIS OF ABDOMINAL WALL: ICD-10-CM

## 2022-01-11 DIAGNOSIS — L02.91 ABSCESS: ICD-10-CM

## 2022-01-11 DIAGNOSIS — R50.9 ACUTE FEBRILE ILLNESS: Primary | ICD-10-CM

## 2022-01-11 DIAGNOSIS — Z98.890 STATUS POST ABDOMINOPLASTY: ICD-10-CM

## 2022-01-11 DIAGNOSIS — D72.825 BANDEMIA: ICD-10-CM

## 2022-01-11 DIAGNOSIS — R43.0 LOSS OF SENSE OF SMELL: ICD-10-CM

## 2022-01-11 PROBLEM — A41.9 SEPSIS, UNSPECIFIED ORGANISM: Status: ACTIVE | Noted: 2022-01-11

## 2022-01-11 PROBLEM — R93.89 ABNORMAL CXR: Status: ACTIVE | Noted: 2022-01-11

## 2022-01-11 PROBLEM — E87.6 HYPOKALEMIA: Status: ACTIVE | Noted: 2022-01-11

## 2022-01-11 LAB
ALBUMIN SERPL-MCNC: 4.1 G/DL (ref 3.5–5.2)
ALBUMIN/GLOB SERPL: 1.1 G/DL
ALP SERPL-CCNC: 80 U/L (ref 39–117)
ALT SERPL W P-5'-P-CCNC: 12 U/L (ref 1–33)
ANION GAP SERPL CALCULATED.3IONS-SCNC: 15 MMOL/L (ref 5–15)
AST SERPL-CCNC: 16 U/L (ref 1–32)
BACTERIA UR QL AUTO: ABNORMAL /HPF
BASOPHILS # BLD AUTO: 0.04 10*3/MM3 (ref 0–0.2)
BASOPHILS NFR BLD AUTO: 0.2 % (ref 0–1.5)
BILIRUB SERPL-MCNC: 0.4 MG/DL (ref 0–1.2)
BILIRUB UR QL STRIP: NEGATIVE
BUN SERPL-MCNC: 13 MG/DL (ref 6–20)
BUN/CREAT SERPL: 20.3 (ref 7–25)
CALCIUM SPEC-SCNC: 9.8 MG/DL (ref 8.6–10.5)
CHLORIDE SERPL-SCNC: 93 MMOL/L (ref 98–107)
CLARITY UR: CLEAR
CO2 SERPL-SCNC: 24 MMOL/L (ref 22–29)
COLOR UR: YELLOW
CREAT SERPL-MCNC: 0.64 MG/DL (ref 0.57–1)
D-LACTATE SERPL-SCNC: 1.8 MMOL/L (ref 0.5–2)
DEPRECATED RDW RBC AUTO: 43.3 FL (ref 37–54)
EOSINOPHIL # BLD AUTO: 0.12 10*3/MM3 (ref 0–0.4)
EOSINOPHIL NFR BLD AUTO: 0.6 % (ref 0.3–6.2)
ERYTHROCYTE [DISTWIDTH] IN BLOOD BY AUTOMATED COUNT: 12.7 % (ref 12.3–15.4)
FLUAV SUBTYP SPEC NAA+PROBE: NOT DETECTED
FLUBV RNA ISLT QL NAA+PROBE: NOT DETECTED
GFR SERPL CREATININE-BSD FRML MDRD: 96 ML/MIN/1.73
GLOBULIN UR ELPH-MCNC: 3.7 GM/DL
GLUCOSE BLDC GLUCOMTR-MCNC: 185 MG/DL (ref 70–130)
GLUCOSE BLDC GLUCOMTR-MCNC: 94 MG/DL (ref 70–130)
GLUCOSE SERPL-MCNC: 138 MG/DL (ref 65–99)
GLUCOSE UR STRIP-MCNC: ABNORMAL MG/DL
HBA1C MFR BLD: 6.2 % (ref 4.8–5.6)
HCT VFR BLD AUTO: 39.8 % (ref 34–46.6)
HGB BLD-MCNC: 13.4 G/DL (ref 12–15.9)
HGB UR QL STRIP.AUTO: ABNORMAL
HYALINE CASTS UR QL AUTO: ABNORMAL /LPF
IMM GRANULOCYTES # BLD AUTO: 0.15 10*3/MM3 (ref 0–0.05)
IMM GRANULOCYTES NFR BLD AUTO: 0.7 % (ref 0–0.5)
KETONES UR QL STRIP: ABNORMAL
LEUKOCYTE ESTERASE UR QL STRIP.AUTO: NEGATIVE
LYMPHOCYTES # BLD AUTO: 1.18 10*3/MM3 (ref 0.7–3.1)
LYMPHOCYTES NFR BLD AUTO: 5.8 % (ref 19.6–45.3)
MAGNESIUM SERPL-MCNC: 2 MG/DL (ref 1.6–2.6)
MCH RBC QN AUTO: 31.3 PG (ref 26.6–33)
MCHC RBC AUTO-ENTMCNC: 33.7 G/DL (ref 31.5–35.7)
MCV RBC AUTO: 93 FL (ref 79–97)
MONOCYTES # BLD AUTO: 0.76 10*3/MM3 (ref 0.1–0.9)
MONOCYTES NFR BLD AUTO: 3.7 % (ref 5–12)
NEUTROPHILS NFR BLD AUTO: 18.07 10*3/MM3 (ref 1.7–7)
NEUTROPHILS NFR BLD AUTO: 89 % (ref 42.7–76)
NITRITE UR QL STRIP: NEGATIVE
NRBC BLD AUTO-RTO: 0 /100 WBC (ref 0–0.2)
PH UR STRIP.AUTO: 6.5 [PH] (ref 5–8)
PLATELET # BLD AUTO: 296 10*3/MM3 (ref 140–450)
PMV BLD AUTO: 9.3 FL (ref 6–12)
POTASSIUM SERPL-SCNC: 3 MMOL/L (ref 3.5–5.2)
PROCALCITONIN SERPL-MCNC: 0.87 NG/ML (ref 0–0.25)
PROT SERPL-MCNC: 7.8 G/DL (ref 6–8.5)
PROT UR QL STRIP: ABNORMAL
RBC # BLD AUTO: 4.28 10*6/MM3 (ref 3.77–5.28)
RBC # UR STRIP: ABNORMAL /HPF
REF LAB TEST METHOD: ABNORMAL
SARS-COV-2 RNA PNL SPEC NAA+PROBE: NOT DETECTED
SODIUM SERPL-SCNC: 132 MMOL/L (ref 136–145)
SP GR UR STRIP: 1.03 (ref 1–1.03)
SQUAMOUS #/AREA URNS HPF: ABNORMAL /HPF
UROBILINOGEN UR QL STRIP: ABNORMAL
WBC # UR STRIP: ABNORMAL /HPF
WBC NRBC COR # BLD: 20.32 10*3/MM3 (ref 3.4–10.8)

## 2022-01-11 PROCEDURE — 87040 BLOOD CULTURE FOR BACTERIA: CPT | Performed by: PHYSICIAN ASSISTANT

## 2022-01-11 PROCEDURE — 99284 EMERGENCY DEPT VISIT MOD MDM: CPT

## 2022-01-11 PROCEDURE — 87076 CULTURE ANAEROBE IDENT EACH: CPT | Performed by: FAMILY MEDICINE

## 2022-01-11 PROCEDURE — 87086 URINE CULTURE/COLONY COUNT: CPT | Performed by: FAMILY MEDICINE

## 2022-01-11 PROCEDURE — 87015 SPECIMEN INFECT AGNT CONCNTJ: CPT | Performed by: FAMILY MEDICINE

## 2022-01-11 PROCEDURE — 87205 SMEAR GRAM STAIN: CPT | Performed by: FAMILY MEDICINE

## 2022-01-11 PROCEDURE — 87147 CULTURE TYPE IMMUNOLOGIC: CPT | Performed by: PHYSICIAN ASSISTANT

## 2022-01-11 PROCEDURE — 85025 COMPLETE CBC W/AUTO DIFF WBC: CPT | Performed by: PHYSICIAN ASSISTANT

## 2022-01-11 PROCEDURE — 84145 PROCALCITONIN (PCT): CPT | Performed by: PHYSICIAN ASSISTANT

## 2022-01-11 PROCEDURE — 25010000002 PIPERACILLIN SOD-TAZOBACTAM PER 1 G: Performed by: FAMILY MEDICINE

## 2022-01-11 PROCEDURE — 25010000002 PIPERACILLIN SOD-TAZOBACTAM PER 1 G: Performed by: PHYSICIAN ASSISTANT

## 2022-01-11 PROCEDURE — 87150 DNA/RNA AMPLIFIED PROBE: CPT | Performed by: PHYSICIAN ASSISTANT

## 2022-01-11 PROCEDURE — 81001 URINALYSIS AUTO W/SCOPE: CPT | Performed by: PHYSICIAN ASSISTANT

## 2022-01-11 PROCEDURE — 63710000001 ONDANSETRON ODT 4 MG TABLET DISPERSIBLE: Performed by: PHYSICIAN ASSISTANT

## 2022-01-11 PROCEDURE — 82962 GLUCOSE BLOOD TEST: CPT

## 2022-01-11 PROCEDURE — 25010000002 VANCOMYCIN 10 G RECONSTITUTED SOLUTION: Performed by: PHYSICIAN ASSISTANT

## 2022-01-11 PROCEDURE — 99223 1ST HOSP IP/OBS HIGH 75: CPT | Performed by: FAMILY MEDICINE

## 2022-01-11 PROCEDURE — 87070 CULTURE OTHR SPECIMN AEROBIC: CPT | Performed by: FAMILY MEDICINE

## 2022-01-11 PROCEDURE — 87075 CULTR BACTERIA EXCEPT BLOOD: CPT | Performed by: FAMILY MEDICINE

## 2022-01-11 PROCEDURE — 87636 SARSCOV2 & INF A&B AMP PRB: CPT | Performed by: EMERGENCY MEDICINE

## 2022-01-11 PROCEDURE — 71045 X-RAY EXAM CHEST 1 VIEW: CPT

## 2022-01-11 PROCEDURE — 87147 CULTURE TYPE IMMUNOLOGIC: CPT | Performed by: FAMILY MEDICINE

## 2022-01-11 PROCEDURE — 80053 COMPREHEN METABOLIC PANEL: CPT | Performed by: PHYSICIAN ASSISTANT

## 2022-01-11 PROCEDURE — 83605 ASSAY OF LACTIC ACID: CPT | Performed by: PHYSICIAN ASSISTANT

## 2022-01-11 PROCEDURE — 74176 CT ABD & PELVIS W/O CONTRAST: CPT

## 2022-01-11 PROCEDURE — 83735 ASSAY OF MAGNESIUM: CPT | Performed by: PHYSICIAN ASSISTANT

## 2022-01-11 PROCEDURE — 83036 HEMOGLOBIN GLYCOSYLATED A1C: CPT | Performed by: FAMILY MEDICINE

## 2022-01-11 RX ORDER — IBUPROFEN 600 MG/1
600 TABLET ORAL ONCE
Status: COMPLETED | OUTPATIENT
Start: 2022-01-11 | End: 2022-01-11

## 2022-01-11 RX ORDER — NALOXONE HCL 0.4 MG/ML
0.4 VIAL (ML) INJECTION
Status: DISCONTINUED | OUTPATIENT
Start: 2022-01-11 | End: 2022-01-14 | Stop reason: HOSPADM

## 2022-01-11 RX ORDER — POTASSIUM CHLORIDE 750 MG/1
40 CAPSULE, EXTENDED RELEASE ORAL AS NEEDED
Status: DISCONTINUED | OUTPATIENT
Start: 2022-01-11 | End: 2022-01-14 | Stop reason: HOSPADM

## 2022-01-11 RX ORDER — POTASSIUM CHLORIDE 1.5 G/1.77G
40 POWDER, FOR SOLUTION ORAL AS NEEDED
Status: DISCONTINUED | OUTPATIENT
Start: 2022-01-11 | End: 2022-01-14 | Stop reason: HOSPADM

## 2022-01-11 RX ORDER — ACETAMINOPHEN 650 MG/1
650 SUPPOSITORY RECTAL EVERY 4 HOURS PRN
Status: DISCONTINUED | OUTPATIENT
Start: 2022-01-11 | End: 2022-01-14 | Stop reason: HOSPADM

## 2022-01-11 RX ORDER — MORPHINE SULFATE 2 MG/ML
1 INJECTION, SOLUTION INTRAMUSCULAR; INTRAVENOUS EVERY 4 HOURS PRN
Status: DISCONTINUED | OUTPATIENT
Start: 2022-01-11 | End: 2022-01-14 | Stop reason: HOSPADM

## 2022-01-11 RX ORDER — SODIUM CHLORIDE 0.9 % (FLUSH) 0.9 %
10 SYRINGE (ML) INJECTION EVERY 12 HOURS SCHEDULED
Status: DISCONTINUED | OUTPATIENT
Start: 2022-01-11 | End: 2022-01-14 | Stop reason: HOSPADM

## 2022-01-11 RX ORDER — MAGNESIUM SULFATE HEPTAHYDRATE 40 MG/ML
4 INJECTION, SOLUTION INTRAVENOUS AS NEEDED
Status: DISCONTINUED | OUTPATIENT
Start: 2022-01-11 | End: 2022-01-14 | Stop reason: HOSPADM

## 2022-01-11 RX ORDER — ACETAMINOPHEN 160 MG/5ML
650 SOLUTION ORAL EVERY 4 HOURS PRN
Status: DISCONTINUED | OUTPATIENT
Start: 2022-01-11 | End: 2022-01-14 | Stop reason: HOSPADM

## 2022-01-11 RX ORDER — NICOTINE POLACRILEX 4 MG
15 LOZENGE BUCCAL
Status: DISCONTINUED | OUTPATIENT
Start: 2022-01-11 | End: 2022-01-14 | Stop reason: HOSPADM

## 2022-01-11 RX ORDER — MAGNESIUM SULFATE HEPTAHYDRATE 40 MG/ML
2 INJECTION, SOLUTION INTRAVENOUS AS NEEDED
Status: DISCONTINUED | OUTPATIENT
Start: 2022-01-11 | End: 2022-01-14 | Stop reason: HOSPADM

## 2022-01-11 RX ORDER — SODIUM CHLORIDE 0.9 % (FLUSH) 0.9 %
10 SYRINGE (ML) INJECTION AS NEEDED
Status: DISCONTINUED | OUTPATIENT
Start: 2022-01-11 | End: 2022-01-14 | Stop reason: HOSPADM

## 2022-01-11 RX ORDER — ROSUVASTATIN CALCIUM 20 MG/1
20 TABLET, COATED ORAL DAILY
Status: DISCONTINUED | OUTPATIENT
Start: 2022-01-11 | End: 2022-01-14 | Stop reason: HOSPADM

## 2022-01-11 RX ORDER — MULTIPLE VITAMINS W/ MINERALS TAB 9MG-400MCG
2 TAB ORAL DAILY
Status: DISCONTINUED | OUTPATIENT
Start: 2022-01-11 | End: 2022-01-12 | Stop reason: ALTCHOICE

## 2022-01-11 RX ORDER — ACETAMINOPHEN 325 MG/1
650 TABLET ORAL EVERY 4 HOURS PRN
Status: DISCONTINUED | OUTPATIENT
Start: 2022-01-11 | End: 2022-01-14 | Stop reason: HOSPADM

## 2022-01-11 RX ORDER — DEXTROSE MONOHYDRATE 25 G/50ML
25 INJECTION, SOLUTION INTRAVENOUS
Status: DISCONTINUED | OUTPATIENT
Start: 2022-01-11 | End: 2022-01-14 | Stop reason: HOSPADM

## 2022-01-11 RX ORDER — ONDANSETRON 4 MG/1
4 TABLET, ORALLY DISINTEGRATING ORAL ONCE
Status: COMPLETED | OUTPATIENT
Start: 2022-01-11 | End: 2022-01-11

## 2022-01-11 RX ORDER — VANCOMYCIN HYDROCHLORIDE 1 G/200ML
1000 INJECTION, SOLUTION INTRAVENOUS EVERY 12 HOURS
Status: DISCONTINUED | OUTPATIENT
Start: 2022-01-12 | End: 2022-01-12

## 2022-01-11 RX ORDER — ONDANSETRON 2 MG/ML
4 INJECTION INTRAMUSCULAR; INTRAVENOUS EVERY 6 HOURS PRN
Status: DISCONTINUED | OUTPATIENT
Start: 2022-01-11 | End: 2022-01-14 | Stop reason: HOSPADM

## 2022-01-11 RX ORDER — POTASSIUM CHLORIDE 7.45 MG/ML
10 INJECTION INTRAVENOUS
Status: DISCONTINUED | OUTPATIENT
Start: 2022-01-11 | End: 2022-01-14 | Stop reason: HOSPADM

## 2022-01-11 RX ORDER — SODIUM CHLORIDE 9 MG/ML
100 INJECTION, SOLUTION INTRAVENOUS CONTINUOUS
Status: ACTIVE | OUTPATIENT
Start: 2022-01-11 | End: 2022-01-12

## 2022-01-11 RX ORDER — FAMOTIDINE 20 MG/1
10 TABLET, FILM COATED ORAL 2 TIMES DAILY
Status: DISCONTINUED | OUTPATIENT
Start: 2022-01-11 | End: 2022-01-14 | Stop reason: HOSPADM

## 2022-01-11 RX ORDER — ONDANSETRON 4 MG/1
4 TABLET, FILM COATED ORAL EVERY 6 HOURS PRN
Status: DISCONTINUED | OUTPATIENT
Start: 2022-01-11 | End: 2022-01-14 | Stop reason: HOSPADM

## 2022-01-11 RX ADMIN — SODIUM CHLORIDE, PRESERVATIVE FREE 10 ML: 5 INJECTION INTRAVENOUS at 21:22

## 2022-01-11 RX ADMIN — TAZOBACTAM SODIUM AND PIPERACILLIN SODIUM 3.38 G: 375; 3 INJECTION, SOLUTION INTRAVENOUS at 21:21

## 2022-01-11 RX ADMIN — ROSUVASTATIN CALCIUM 20 MG: 20 TABLET, FILM COATED ORAL at 17:05

## 2022-01-11 RX ADMIN — ONDANSETRON 4 MG: 4 TABLET, ORALLY DISINTEGRATING ORAL at 11:49

## 2022-01-11 RX ADMIN — POTASSIUM CHLORIDE 40 MEQ: 750 CAPSULE, EXTENDED RELEASE ORAL at 16:21

## 2022-01-11 RX ADMIN — ACETAMINOPHEN 650 MG: 325 TABLET, FILM COATED ORAL at 21:21

## 2022-01-11 RX ADMIN — VANCOMYCIN HYDROCHLORIDE 1250 MG: 10 INJECTION, POWDER, LYOPHILIZED, FOR SOLUTION INTRAVENOUS at 14:20

## 2022-01-11 RX ADMIN — POTASSIUM CHLORIDE 40 MEQ: 750 CAPSULE, EXTENDED RELEASE ORAL at 21:22

## 2022-01-11 RX ADMIN — IBUPROFEN 600 MG: 600 TABLET ORAL at 11:49

## 2022-01-11 RX ADMIN — TAZOBACTAM SODIUM AND PIPERACILLIN SODIUM 3.38 G: 375; 3 INJECTION, SOLUTION INTRAVENOUS at 13:35

## 2022-01-11 RX ADMIN — FAMOTIDINE 10 MG: 20 TABLET, FILM COATED ORAL at 21:22

## 2022-01-11 RX ADMIN — SODIUM CHLORIDE 100 ML/HR: 9 INJECTION, SOLUTION INTRAVENOUS at 17:05

## 2022-01-11 NOTE — H&P
[facility] Medicine Services  HISTORY AND PHYSICAL    Patient Name: Suze Vaca  : 1965  MRN: [id number]  Primary Care Physician: Martha Austin MD  Date of admission: 2022      Subjective   Subjective     Chief Complaint:  Rash     HPI:  Suze Vaca is a 56 y.o. female with PMHx DM2, HTN, HLD, GERD, hx of bilateral breast reduction with excess abdominal skin. Patient had a recent hx of 40 lbs weight loss, felt she was at her ideal weight, and decided to proceed with abdominoplasty to remove excess abdominal skin with Dr Daniel Buckner 21. Patient did well with procedure, was kept overnight for observation, and discharged home the next day. She was given Ancef prior to surgery and was discharged home with 3 more days of Keflex which she completed.  She presents to ED today at direction of Dr Buckner due to rash. Patient states her rash started  (22) night. It did not itch at first but is starting to itch now. She denies any new medications and the only change in her medications has been a decrease in her Jardiance from 25mg to 10mg. No blistering.   Patient has noticed some drainage from her incision.   She did not have a fever until today. She denies any respiratory symptoms and is vaccinated against COVID-19. Her CXR does show GGO concerning for COVID.   Patient given Vanc/Zosyn in ED and states she is feeling better. She is 99% on RA. Hospital medicine asked to admit.    COVID Details:    Symptoms:    [] NONE [x] Fever []  Cough [] Shortness of breath [] Change in taste/smell      Review of Systems   Constitutional: Negative for activity change, chills, fatigue and fever.   HENT: Negative for congestion, ear pain, postnasal drip and sneezing.    Eyes: Negative for visual disturbance.   Respiratory: Negative for cough, shortness of breath and wheezing.    Cardiovascular: Negative for chest pain, palpitations and leg swelling.    Gastrointestinal: Negative for abdominal pain, constipation, diarrhea, nausea and vomiting.   Genitourinary: Negative for dysuria, frequency and urgency.   Musculoskeletal: Positive for back pain.   Skin: Positive for rash.   Neurological: Negative for dizziness, syncope, weakness and headaches.   Psychiatric/Behavioral: Negative for agitation and confusion.        Personal History     Past Medical History:   Diagnosis Date   • Diabetes mellitus (HCC)    • GERD (gastroesophageal reflux disease)    • Hyperlipidemia    • Hypertension    • Wears glasses        Past Surgical History:   Procedure Laterality Date   • ABDOMINOPLASTY N/A 12/13/2021    Procedure: ABDOMINOPLASTY WITH LIPOSUCTION;  Surgeon: Daniel Buckner MD;  Location: [facility];  Service: Plastics;  Laterality: N/A;   • REDUCTION MAMMAPLASTY Bilateral    • VAGINAL DELIVERY         Family History:  family history is not on file. Otherwise pertinent FHx was reviewed and unremarkable.     Social History:  reports that she has never smoked. She has never used smokeless tobacco. She reports current alcohol use of about 1.0 standard drink of alcohol per week. She reports that she does not use drugs.  Social History     Social History Narrative   • Not on file       Medications:  Available home medication information reviewed.  (Not in a hospital admission)      No Known Allergies    Objective   Objective     Vital Signs:   Temp:  [102.2 °F (39 °C)-103 °F (39.4 °C)] 102.2 °F (39 °C)  Heart Rate:  [112] 112  Resp:  [14] 14  BP: (126)/(66) 126/66       Physical Exam   Constitutional: Awake, alert, NAD, non-toxic   Eyes: PERRLA, sclerae anicteric, no conjunctival injection  HENT: NCAT, mucous membranes moist  Neck: Supple, no thyromegaly, no lymphadenopathy, trachea midline  Respiratory: Clear to auscultation bilaterally, nonlabored respirations on RA  Cardiovascular: RRR, no murmurs, rubs, or gallops, palpable pedal pulses bilaterally  Gastrointestinal: Positive  bowel sounds, soft, nontender, nondistended  Musculoskeletal: No bilateral ankle edema, no clubbing or cyanosis to extremities  Psychiatric: Appropriate affect, cooperative  Neurologic: Oriented x 3, strength symmetric in all extremities, Cranial Nerves grossly intact to confrontation, speech clear  Skin: Diffuse erythema noted from waist up abdomen covering both breasts, no blistering, no skin sloughing. Small area around L waist where incision is opened with some drainage     Result Review:  I have personally reviewed the results from the time of this admission to 1/11/2022 15:22 EST and agree with these findings:  [x]  Laboratory  [x]  Microbiology  [x]  Radiology  []  EKG/Telemetry   []  Cardiology/Vascular   []  Pathology  [x]  Old records  []  Other:      LAB RESULTS:      Lab 01/11/22  1222   WBC 20.32*   HEMOGLOBIN 13.4   HEMATOCRIT 39.8   PLATELETS 296   NEUTROS ABS 18.07*   IMMATURE GRANS (ABS) 0.15*   LYMPHS ABS 1.18   MONOS ABS 0.76   EOS ABS 0.12   MCV 93.0   PROCALCITONIN 0.87*   LACTATE 1.8         Lab 01/11/22  1222   SODIUM 132*   POTASSIUM 3.0*   CHLORIDE 93*   CO2 24.0   ANION GAP 15.0   BUN 13   CREATININE 0.64   GLUCOSE 138*   CALCIUM 9.8   MAGNESIUM 2.0         Lab 01/11/22  1222   TOTAL PROTEIN 7.8   ALBUMIN 4.10   GLOBULIN 3.7   ALT (SGPT) 12   AST (SGOT) 16   BILIRUBIN 0.4   ALK PHOS 80                     UA    Urinalysis 1/11/22 1/11/22    1342 1342   Squamous Epithelial Cells, UA  0-2   Specific Gravity, UA 1.033 (A)    Ketones, UA 80 mg/dL (3+) (A)    Blood, UA Trace (A)    Leukocytes, UA Negative    Nitrite, UA Negative    RBC, UA  3-6 (A)   WBC, UA  3-5 (A)   Bacteria, UA  None Seen   (A) Abnormal value              Microbiology Results (last 10 days)     Procedure Component Value - Date/Time    COVID PRE-OP / PRE-PROCEDURE SCREENING ORDER (NO ISOLATION) - Swab, Nasopharynx [[id number]]  (Normal) Collected: 01/11/22 1100    Lab Status: Final result Specimen: Swab from Nasopharynx  Updated: 01/11/22 1156    Narrative:      The following orders were created for panel order COVID PRE-OP / PRE-PROCEDURE SCREENING ORDER (NO ISOLATION) - Swab, Nasopharynx.  Procedure                               Abnormality         Status                     ---------                               -----------         ------                     COVID-19 and FLU A/B PCR...[[id number]]  Normal              Final result                 Please view results for these tests on the individual orders.    COVID-19 and FLU A/B PCR - Swab, Nasopharynx [[id number]]  (Normal) Collected: 01/11/22 1100    Lab Status: Final result Specimen: Swab from Nasopharynx Updated: 01/11/22 1156     COVID19 Not Detected     Influenza A PCR Not Detected     Influenza B PCR Not Detected    Narrative:      Fact sheet for providers: https://www.fda.gov/media/296131/download    Fact sheet for patients: https://www.fda.gov/media/395640/download    Test performed by PCR.          XR Chest 1 View    Result Date: 1/11/2022  EXAMINATION: XR CHEST 1 VW-01/11/2022:  INDICATION: Acute febrile illness; R50.9-Fever, unspecified; D72.825-Bandemia; Z98.890-Other specified postprocedural states; R43.0-Anosmia.  COMPARISON: NONE.  FINDINGS:  The heart, mediastinum and pulmonary vasculature appear within normal limits. The lungs appear normally expanded and initially grossly clear. Slight haziness of the lateral soft tissues of the right and left chest appears to be due to overlying breast tissue shadow. Groundglass changes of Covid-19 pneumonia would have a similar appearance and distribution however.      Impression: Hazy interstitial opacity at the lateral right and left mid and lower lungs. This may simply represent overlying breast tissue shadow. With history of dyspnea and fever, however, consider groundglass changes associated with Covid-19 pneumonia.  D:  01/11/2022 E:  01/11/2022               Assessment/Plan   Assessment & Plan     Active [facility]  Problems    Diagnosis  POA   • Acute febrile illness [R50.9]  Yes   • Sepsis, unspecified organism (HCC) [A41.9]  Unknown   • Abscess [L02.91]  Unknown   • Cellulitis of abdominal wall [L03.311]  Unknown   • S/P abdominoplasty [Z98.890]  Not Applicable   • Hypokalemia [E87.6]  Unknown   • Abnormal CXR [R93.89]  Unknown       Sepsis (fever, tachycardia, leukocytosis. Source: cellulitis/abscess)  Abdominal wall cellulitis/rash  Concern for small abscess above umbilicus   Recent Abdominoplasty   -Patient sent to ED by Dr Daniel Buckner, discussed case personally  -Will place consult for IR for CT guided drainage of small fluid collection and possibly place drain if needed   -Discussed with Dr Buckner, he may take biopsy of skin as appearance not consistent with typical cellulitis   -Continue Vancomycin and Zosyn for now (started in ED)  -Blood Cx x2 pending   -Obtain culture from drainage from incision site   -Continue IVF   -Consult to ID   -CBC, BMP, procal in AM    Abnormal CXR  Fever  -CXR with possible Ground-Glass changes  -Obtain CT chest   -Initial COVID test negative   -Will isolate for now given fever and repeat [place]-COVID test in AM. If negative, can take out of precautions   -Per patient, she is fully vaccinated  -99% on RA     HTN  -Hold BP meds in setting of sepsis, resume as able    DM2  Glucosuria, Proteinuria   -Hold Jardiance, Metformin   -LDSSI for now   -Obtain Hga1c    GERD  -Continue Pepcid     HLD  -Continue Crestor     Hypokalemia  -Mg 2.0  -Replace per protocol     L flank pain  -UA with 3-5 WBC, leuk esterase Negative, no bacteria seen  -Will send for culture   -Denies dysuria, urgency, frequency    DVT prophylaxis:  Lovenox    CODE STATUS:  Full Code   There are no questions and answers to display.       Admission Status:  I believe this patient meets INPATIENT status due to Sepsis, cellulitis, concern for abscess.  I feel patient’s risk for adverse outcomes and need for care warrant INPATIENT  evaluation and I predict the patient’s care encounter to likely last beyond 2 midnights.    Zaida White DO  01/11/22

## 2022-01-11 NOTE — PROGRESS NOTES
"Pharmacy Consult-Vancomycin Dosing  Suze Vaca is a  56 y.o. female receiving vancomycin therapy.     Indication: sepsis, SSTI  Consulting Provider: Hospitalist  ID Consult: Y    Goal AUC: 400 - 600 mg/L*hr    Current Antimicrobial Therapy  Anti-Infectives (From admission, onward)      Ordered     Dose/Rate Route Frequency Start Stop    01/11/22 1628  piperacillin-tazobactam (ZOSYN) 3.375 g in iso-osmotic dextrose 50 ml (premix)        Ordering Provider: Zaida White DO    3.375 g  over 4 Hours Intravenous Every 8 Hours 01/11/22 2000 01/16/22 1959 01/11/22 1628  Pharmacy to dose vancomycin        Ordering Provider: Zaida White DO     Does not apply Continuous PRN 01/11/22 1628 01/16/22 1627    01/11/22 1332  piperacillin-tazobactam (ZOSYN) 3.375 g in iso-osmotic dextrose 50 ml (premix)        Ordering Provider: Naveen Ulloa PA    3.375 g  over 30 Minutes Intravenous Once 01/11/22 1334 01/11/22 1420    01/11/22 1332  vancomycin 1250 mg/250 mL 0.9% NS IVPB ([facility])        Ordering Provider: Naveen Ulloa PA    20 mg/kg × 68 kg Intravenous Once 01/11/22 1334 01/11/22 1420            Allergies  Allergies as of 01/11/2022    (No Known Allergies)       Labs    Results from last 7 days   Lab Units 01/11/22  1222   BUN mg/dL 13   CREATININE mg/dL 0.64       Results from last 7 days   Lab Units 01/11/22  1222   WBC 10*3/mm3 20.32*       Evaluation of Dosing     Last Dose Received in the ED/Outside Facility: 1250mg 1/11 @ 1420  Is Patient on Dialysis or Renal Replacement: N    Ht - 162.6 cm (64\")  Wt - 68 kg (150 lb)    Estimated Creatinine Clearance: 91.9 mL/min (by C-G formula based on SCr of 0.64 mg/dL).    Intake & Output (last 3 days)       None            Microbiology and Radiology  Microbiology Results (last 10 days)       Procedure Component Value - Date/Time    COVID PRE-OP / PRE-PROCEDURE SCREENING ORDER (NO ISOLATION) - Swab, Nasopharynx [[id number]]  (Normal) Collected: " 01/11/22 1100    Lab Status: Final result Specimen: Swab from Nasopharynx Updated: 01/11/22 1156    Narrative:      The following orders were created for panel order COVID PRE-OP / PRE-PROCEDURE SCREENING ORDER (NO ISOLATION) - Swab, Nasopharynx.  Procedure                               Abnormality         Status                     ---------                               -----------         ------                     COVID-19 and FLU A/B PCR...[[id number]]  Normal              Final result                 Please view results for these tests on the individual orders.    COVID-19 and FLU A/B PCR - Swab, Nasopharynx [[id number]]  (Normal) Collected: 01/11/22 1100    Lab Status: Final result Specimen: Swab from Nasopharynx Updated: 01/11/22 1156     COVID19 Not Detected     Influenza A PCR Not Detected     Influenza B PCR Not Detected    Narrative:      Fact sheet for providers: https://www.fda.gov/media/336695/download    Fact sheet for patients: https://www.fda.gov/media/735009/download    Test performed by PCR.            Reported Vancomycin Levels                     [employer] AUC Calculation:    Current AUC:  mg/L*hr    Predicted Steady State AUC on Current Dose: mg/L*hr  _________________________________    Predicted Steady State AUC on New Dose: 495 mg/L*hr    Assessment/Plan:  Pharmacy to dose vancomycin for sepsis, SSTI. Goal -600 mg/L*hr.  Patient received loading dose of vancomycin 1250mg (~18mg/kg) IV on 1/11 @ 1420. Initiate maintenance dose of vancomycin 1000mg (~15mg/kg) IV Q12H starting on 1/12 @ 0000.  Assess clearance by random level on 1/13 w/ AM labs.  Pharmacy will continue to monitor renal function, cultures and sensitivities, and clinical status to adjust regimen as necessary.    Zachery Clifford, PharmD  1/11/2022  16:37 EST

## 2022-01-11 NOTE — ED PROVIDER NOTES
Subjective   56-year-old female sent to the emergency department today by her plastic surgeon Dr. Buckner for fever.  Apparently she began breaking out with a rash on her abdomen and chest.  He states that he has seen her and did not feel like this was due to infection due to that she did not have any purulent drainage and she was not really having a lot of pain.  She does have a fever 102.2.  She reports that she lost smell and taste this all occurred today.  She reports she really has not had a lot of abdominal pain.  She is a little bit of nausea but no vomiting.  She denies any dysuria frequency urgency or hematuria associated with this.      History provided by:  Patient   used: No    Rash  Location: Abdomen and chest.  Quality: itchiness    Quality: not blistering, not bruising, not burning, not draining, not dry, not painful, not peeling, not red, not scaling, not swelling and not weeping    Severity:  Severe  Onset quality:  Sudden  Duration:  1 day  Timing:  Constant      Review of Systems   Skin: Positive for rash.       Past Medical History:   Diagnosis Date   • Diabetes mellitus (HCC)    • GERD (gastroesophageal reflux disease)    • Hyperlipidemia    • Hypertension    • Wears glasses        No Known Allergies    Past Surgical History:   Procedure Laterality Date   • ABDOMINOPLASTY N/A 12/13/2021    Procedure: ABDOMINOPLASTY WITH LIPOSUCTION;  Surgeon: Daniel Buckner MD;  Location: [facility];  Service: Plastics;  Laterality: N/A;   • REDUCTION MAMMAPLASTY Bilateral    • VAGINAL DELIVERY         History reviewed. No pertinent family history.    Social History     Socioeconomic History   • Marital status:    Tobacco Use   • Smoking status: Never Smoker   • Smokeless tobacco: Never Used   Vaping Use   • Vaping Use: Never used   Substance and Sexual Activity   • Alcohol use: Yes     Alcohol/week: 1.0 standard drink     Types: 1 Glasses of wine per week     Comment: SOCAILLY   • Drug  use: Never           Objective   Physical Exam  Vitals and nursing note reviewed.   Constitutional:       Appearance: She is well-developed.   HENT:      Head: Normocephalic and atraumatic.      Right Ear: External ear normal.      Left Ear: External ear normal.      Nose: Nose normal.   Eyes:      General: No scleral icterus.     Conjunctiva/sclera: Conjunctivae normal.      Pupils: Pupils are equal, round, and reactive to light.   Neck:      Thyroid: No thyromegaly.   Cardiovascular:      Rate and Rhythm: Normal rate and regular rhythm.      Heart sounds: Normal heart sounds.   Pulmonary:      Effort: Pulmonary effort is normal. No respiratory distress.      Breath sounds: Normal breath sounds. No wheezing or rales.   Chest:      Chest wall: No tenderness.   Abdominal:      General: Bowel sounds are normal. There is no distension.      Palpations: Abdomen is soft.      Tenderness: There is no abdominal tenderness.      Comments: Wounds appear to be healing well but there is some tenderness above the umbilicus.  No crepitus, guarding or rebound tenderness.    Musculoskeletal:         General: Normal range of motion.      Cervical back: Normal range of motion.   Lymphadenopathy:      Cervical: No cervical adenopathy.   Skin:     General: Skin is warm and dry.   Neurological:      Mental Status: She is alert and oriented to person, place, and time.      Cranial Nerves: No cranial nerve deficit.      Coordination: Coordination normal.      Deep Tendon Reflexes: Reflexes are normal and symmetric. Reflexes normal.   Psychiatric:         Behavior: Behavior normal.         Thought Content: Thought content normal.         Judgment: Judgment normal.         Procedures           ED Course  ED Course as of 03/31/22 1252   Tue Jan 11, 2022   1341 I spoke to Dr. Bcukner.  He asked me to have the hospitalist admit he is going to get a CT of the abdomen and pelvis and start IV antibiotics. [CEE]      ED Course User Index  [CEE]  Naveen Ulloa PA                                         No results found for this or any previous visit (from the past 24 hour(s)).  Note: In addition to lab results from this visit, the labs listed above may include labs taken at another facility or during a different encounter within the last 24 hours. Please correlate lab times with ED admission and discharge times for further clarification of the services performed during this visit.    US Guided Abscess Drain Abdomen/Pelvis   Final Result   Impression:                  Successful ultrasound-guided aspiration and drainage of a postop seroma   in the anterior abdominal wall.       Thank you for the opportunity to assist in the care of your patient.       This report was finalized on 1/12/2022 4:00 PM by Derrick Pete MD.          CT Chest Without Contrast Diagnostic   Final Result   Nonspecific and fairly symmetric bilateral axillary   lymphadenopathy. Finding is of uncertain clinical significance.       Otherwise essentially normal noncontrast CT of the chest. Specifically   there is no evidence of significant groundglass opacity or other   findings to suggest acute infectious or inflammatory process. There is   no suspicious pulmonary nodularity.           This report was finalized on 1/12/2022 1:37 PM by Nick Hamilton.          CT Abdomen Pelvis Without Contrast   Final Result   1. Focal inflammatory collection in the deep subcutaneous tissues of the   abdominal midline, above the umbilicus, suspicious for small abscess.   Diffuse subcutaneous fat stranding, which may represent an overlying   cellulitis.       2. No evidence of intra-abdominal inflammatory focus or other   intra-abdominal or intrapelvic disease elsewhere.       D:  01/11/2022   E:  01/11/2022       This report was finalized on 1/11/2022 9:22 PM by Dr. Oliver Sharma MD.          XR Chest 1 View   Final Result   Hazy interstitial opacity at the lateral right and left mid   and lower  lungs. This may simply represent overlying breast tissue   shadow. With history of dyspnea and fever, however, consider groundglass   changes associated with Covid-19 pneumonia.       D:  01/11/2022   E:  01/11/2022                This report was finalized on 1/11/2022 8:59 PM by Dr. Oliver Sharma MD.            Vitals:    01/13/22 2332 01/14/22 0403 01/14/22 0722 01/14/22 1120   BP: 127/70 123/65 149/86 124/86   BP Location: Left arm Left arm Left arm Left arm   Patient Position: Lying Lying Lying Sitting   Pulse: 67 76 73 70   Resp: 18 18 16 18   Temp: 98 °F (36.7 °C) 98 °F (36.7 °C) 98.5 °F (36.9 °C) 98.1 °F (36.7 °C)   TempSrc: Oral Oral Oral Oral   SpO2:   92% 96%   Weight:       Height:         Medications   sodium chloride 0.9 % infusion (100 mL/hr Intravenous New Bag 1/11/22 1705)   ibuprofen (ADVIL,MOTRIN) tablet 600 mg (600 mg Oral Given 1/11/22 1149)   ondansetron ODT (ZOFRAN-ODT) disintegrating tablet 4 mg (4 mg Oral Given 1/11/22 1149)   piperacillin-tazobactam (ZOSYN) 3.375 g in iso-osmotic dextrose 50 ml (premix) (0 g Intravenous Stopped 1/11/22 1420)   vancomycin 1250 mg/250 mL 0.9% NS IVPB ([facility]) (1,250 mg Intravenous New Bag 1/11/22 1420)   fentaNYL citrate (PF) (SUBLIMAZE) injection (50 mcg Intravenous Given 1/12/22 1529)   midazolam (VERSED) injection (1 mg Intravenous Given 1/12/22 1528)   lidocaine (XYLOCAINE) 1 % injection 30 mL (30 mL Infiltration Given 1/12/22 1819)   sodium hypochlorite (DAKIN'S 1/4 STRENGTH) 0.125 % topical solution 0.125% solution ( Topical Given 1/12/22 1820)   lidocaine PF 1% (XYLOCAINE) injection 5 mL (5 mL Injection Given 1/12/22 1815)     ECG/EMG Results (last 24 hours)     ** No results found for the last 24 hours. **        No orders to display               MDM  Number of Diagnoses or Management Options  Acute febrile illness: new and requires workup  Bandemia: new and requires workup  Loss of sense of smell: new and requires workup  Status post abdominoplasty:  established and improving     Amount and/or Complexity of Data Reviewed  Clinical lab tests: reviewed and ordered  Tests in the radiology section of CPT®: reviewed and ordered  Tests in the medicine section of CPT®: ordered and reviewed  Discuss the patient with other providers: yes        Final diagnoses:   Acute febrile illness   Bandemia   Status post abdominoplasty   Loss of sense of smell       ED Disposition  ED Disposition     ED Disposition   Decision to Admit    Condition   --    Comment   Level of Care: Telemetry [5]   Diagnosis: Acute febrile illness [575198]   Certification: I Certify That Inpatient Hospital Services Are Medically Necessary For Greater Than 2 Midnights               Daniel Buckner MD  [address] [facility]  [address]  [place] [id number]  [phone]    Follow up  Your appointment is on Tuesday, January 18th at 3pm    John Kay MD  [address] [facility]  [address]  [place] [id number]  [phone]    Follow up on 1/15/2022  1.  Have her come to our office starting tomorrow Saturday 1/15 at 830 for: Rocephin 2 g IV daily via peripheral IV  2.  Appointment to see me on Tuesday 1/18 at 1130-this appointment has been made    [place]  [address] [facility] [address]  [facility] [id number] [phone]  Follow up on 1/15/2022  At 830 for infusion and wound care    Dr. MERAZ    Follow up  Call your primary care provider first thing Monday morning for a 1 week hospital follow up    Daniel Buckner MD  [address] [facility]  [address]  [facility] [address]  [phone]               Medication List      New Prescriptions    sodium hypochlorite 0.125 % solution topical solution 0.125%  Commonly known as: DAKIN'S 1/4 STRENGTH  Apply Dakins to gauze then pack wound BID        ASK your doctor about these medications    cefTRIAXone  Commonly known as: ROCEPHIN  Infuse 100 mL into a venous catheter Daily for 8 doses. Indications: Infection of the Skin and/or Soft Tissue  Ask  about: Should I take this medication?     HYDROcodone-acetaminophen 5-325 MG per tablet  Commonly known as: Norco  Take 1 tablet by mouth Every 6 (Six) Hours As Needed for Moderate Pain  for up to 3 days.  Ask about: Should I take this medication?     lactobacillus acidophilus capsule capsule  Take 1 capsule by mouth Daily for 30 days.  Ask about: Should I take this medication?           Where to Get Your Medications      These medications were sent to [facility] - [address] [facility] [phone] [facility] [phone]   [address] [facility] [address]    Phone: [phone]   · HYDROcodone-acetaminophen 5-325 MG per tablet  · lactobacillus acidophilus capsule capsule  · sodium hypochlorite 0.125 % solution topical solution 0.125%     Information about where to get these medications is not yet available    Ask your nurse or doctor about these medications  · cefTRIAXone           Naveen Ulloa PA  01/11/22 2028       Naveen Ulloa PA  03/31/22 1253       Naveen Ulloa PA  04/22/22 0659

## 2022-01-12 ENCOUNTER — APPOINTMENT (OUTPATIENT)
Dept: ULTRASOUND IMAGING | Facility: HOSPITAL | Age: 57
End: 2022-01-12

## 2022-01-12 ENCOUNTER — APPOINTMENT (OUTPATIENT)
Dept: CT IMAGING | Facility: HOSPITAL | Age: 57
End: 2022-01-12

## 2022-01-12 LAB
ANION GAP SERPL CALCULATED.3IONS-SCNC: 10 MMOL/L (ref 5–15)
BACTERIA SPEC AEROBE CULT: NORMAL
BASOPHILS # BLD AUTO: 0.03 10*3/MM3 (ref 0–0.2)
BASOPHILS NFR BLD AUTO: 0.2 % (ref 0–1.5)
BUN SERPL-MCNC: 13 MG/DL (ref 6–20)
BUN/CREAT SERPL: 16.5 (ref 7–25)
CALCIUM SPEC-SCNC: 9.4 MG/DL (ref 8.6–10.5)
CHLORIDE SERPL-SCNC: 101 MMOL/L (ref 98–107)
CK SERPL-CCNC: 39 U/L (ref 20–180)
CO2 SERPL-SCNC: 24 MMOL/L (ref 22–29)
CREAT SERPL-MCNC: 0.79 MG/DL (ref 0.57–1)
DEPRECATED RDW RBC AUTO: 43.2 FL (ref 37–54)
EOSINOPHIL # BLD AUTO: 0.03 10*3/MM3 (ref 0–0.4)
EOSINOPHIL NFR BLD AUTO: 0.2 % (ref 0.3–6.2)
ERYTHROCYTE [DISTWIDTH] IN BLOOD BY AUTOMATED COUNT: 12.9 % (ref 12.3–15.4)
GFR SERPL CREATININE-BSD FRML MDRD: 75 ML/MIN/1.73
GLUCOSE BLDC GLUCOMTR-MCNC: 120 MG/DL (ref 70–130)
GLUCOSE BLDC GLUCOMTR-MCNC: 148 MG/DL (ref 70–130)
GLUCOSE BLDC GLUCOMTR-MCNC: 99 MG/DL (ref 70–130)
GLUCOSE SERPL-MCNC: 119 MG/DL (ref 65–99)
HCT VFR BLD AUTO: 39.6 % (ref 34–46.6)
HGB BLD-MCNC: 13.3 G/DL (ref 12–15.9)
IMM GRANULOCYTES # BLD AUTO: 0.07 10*3/MM3 (ref 0–0.05)
IMM GRANULOCYTES NFR BLD AUTO: 0.5 % (ref 0–0.5)
INR PPP: 1.18 (ref 0.85–1.16)
LYMPHOCYTES # BLD AUTO: 0.6 10*3/MM3 (ref 0.7–3.1)
LYMPHOCYTES NFR BLD AUTO: 3.9 % (ref 19.6–45.3)
MAGNESIUM SERPL-MCNC: 2 MG/DL (ref 1.6–2.6)
MCH RBC QN AUTO: 30.9 PG (ref 26.6–33)
MCHC RBC AUTO-ENTMCNC: 33.6 G/DL (ref 31.5–35.7)
MCV RBC AUTO: 92.1 FL (ref 79–97)
MONOCYTES # BLD AUTO: 0.88 10*3/MM3 (ref 0.1–0.9)
MONOCYTES NFR BLD AUTO: 5.7 % (ref 5–12)
NEUTROPHILS NFR BLD AUTO: 13.72 10*3/MM3 (ref 1.7–7)
NEUTROPHILS NFR BLD AUTO: 89.5 % (ref 42.7–76)
NRBC BLD AUTO-RTO: 0 /100 WBC (ref 0–0.2)
PLATELET # BLD AUTO: 262 10*3/MM3 (ref 140–450)
PMV BLD AUTO: 9.1 FL (ref 6–12)
POTASSIUM SERPL-SCNC: 3.9 MMOL/L (ref 3.5–5.2)
PROCALCITONIN SERPL-MCNC: 0.77 NG/ML (ref 0–0.25)
PROTHROMBIN TIME: 14.6 SECONDS (ref 11.4–14.4)
RBC # BLD AUTO: 4.3 10*6/MM3 (ref 3.77–5.28)
SARS-COV-2 RNA PNL SPEC NAA+PROBE: NOT DETECTED
SODIUM SERPL-SCNC: 135 MMOL/L (ref 136–145)
WBC NRBC COR # BLD: 15.33 10*3/MM3 (ref 3.4–10.8)

## 2022-01-12 PROCEDURE — 80048 BASIC METABOLIC PNL TOTAL CA: CPT | Performed by: FAMILY MEDICINE

## 2022-01-12 PROCEDURE — 25010000002 FENTANYL CITRATE (PF) 50 MCG/ML SOLUTION: Performed by: RADIOLOGY

## 2022-01-12 PROCEDURE — 84145 PROCALCITONIN (PCT): CPT | Performed by: FAMILY MEDICINE

## 2022-01-12 PROCEDURE — 25010000002 ENOXAPARIN PER 10 MG: Performed by: FAMILY MEDICINE

## 2022-01-12 PROCEDURE — 87070 CULTURE OTHR SPECIMN AEROBIC: CPT | Performed by: FAMILY MEDICINE

## 2022-01-12 PROCEDURE — 71250 CT THORAX DX C-: CPT

## 2022-01-12 PROCEDURE — 87015 SPECIMEN INFECT AGNT CONCNTJ: CPT | Performed by: FAMILY MEDICINE

## 2022-01-12 PROCEDURE — 25010000002 VANCOMYCIN PER 500 MG

## 2022-01-12 PROCEDURE — 49406 IMAGE CATH FLUID PERI/RETRO: CPT

## 2022-01-12 PROCEDURE — C1729 CATH, DRAINAGE: HCPCS

## 2022-01-12 PROCEDURE — 85025 COMPLETE CBC W/AUTO DIFF WBC: CPT | Performed by: FAMILY MEDICINE

## 2022-01-12 PROCEDURE — 99152 MOD SED SAME PHYS/QHP 5/>YRS: CPT

## 2022-01-12 PROCEDURE — 25010000002 MIDAZOLAM PER 1 MG: Performed by: RADIOLOGY

## 2022-01-12 PROCEDURE — 85610 PROTHROMBIN TIME: CPT | Performed by: RADIOLOGY

## 2022-01-12 PROCEDURE — 82962 GLUCOSE BLOOD TEST: CPT

## 2022-01-12 PROCEDURE — 87015 SPECIMEN INFECT AGNT CONCNTJ: CPT | Performed by: INTERNAL MEDICINE

## 2022-01-12 PROCEDURE — 25010000002 DAPTOMYCIN PER 1 MG: Performed by: NURSE PRACTITIONER

## 2022-01-12 PROCEDURE — 87205 SMEAR GRAM STAIN: CPT | Performed by: FAMILY MEDICINE

## 2022-01-12 PROCEDURE — U0004 COV-19 TEST NON-CDC HGH THRU: HCPCS | Performed by: FAMILY MEDICINE

## 2022-01-12 PROCEDURE — 75989 ABSCESS DRAINAGE UNDER X-RAY: CPT

## 2022-01-12 PROCEDURE — 82550 ASSAY OF CK (CPK): CPT | Performed by: NURSE PRACTITIONER

## 2022-01-12 PROCEDURE — 25010000002 PIPERACILLIN SOD-TAZOBACTAM PER 1 G: Performed by: FAMILY MEDICINE

## 2022-01-12 PROCEDURE — 83735 ASSAY OF MAGNESIUM: CPT | Performed by: FAMILY MEDICINE

## 2022-01-12 PROCEDURE — 87075 CULTR BACTERIA EXCEPT BLOOD: CPT | Performed by: INTERNAL MEDICINE

## 2022-01-12 PROCEDURE — 0W9F30Z DRAINAGE OF ABDOMINAL WALL WITH DRAINAGE DEVICE, PERCUTANEOUS APPROACH: ICD-10-PCS | Performed by: RADIOLOGY

## 2022-01-12 PROCEDURE — 99232 SBSQ HOSP IP/OBS MODERATE 35: CPT | Performed by: FAMILY MEDICINE

## 2022-01-12 RX ORDER — FENTANYL CITRATE 50 UG/ML
INJECTION, SOLUTION INTRAMUSCULAR; INTRAVENOUS
Status: DISPENSED
Start: 2022-01-12 | End: 2022-01-13

## 2022-01-12 RX ORDER — MIDAZOLAM HYDROCHLORIDE 1 MG/ML
INJECTION INTRAMUSCULAR; INTRAVENOUS
Status: DISPENSED
Start: 2022-01-12 | End: 2022-01-13

## 2022-01-12 RX ORDER — LIDOCAINE HYDROCHLORIDE 10 MG/ML
5 INJECTION, SOLUTION EPIDURAL; INFILTRATION; INTRACAUDAL; PERINEURAL ONCE
Status: COMPLETED | OUTPATIENT
Start: 2022-01-12 | End: 2022-01-12

## 2022-01-12 RX ORDER — CLINDAMYCIN PHOSPHATE 900 MG/50ML
900 INJECTION INTRAVENOUS EVERY 8 HOURS
Status: DISCONTINUED | OUTPATIENT
Start: 2022-01-12 | End: 2022-01-14

## 2022-01-12 RX ORDER — DIPHENOXYLATE HYDROCHLORIDE AND ATROPINE SULFATE 2.5; .025 MG/1; MG/1
1 TABLET ORAL DAILY
Status: DISCONTINUED | OUTPATIENT
Start: 2022-01-12 | End: 2022-01-14 | Stop reason: HOSPADM

## 2022-01-12 RX ORDER — LIDOCAINE HYDROCHLORIDE 10 MG/ML
30 INJECTION, SOLUTION INFILTRATION; PERINEURAL ONCE
Status: COMPLETED | OUTPATIENT
Start: 2022-01-12 | End: 2022-01-12

## 2022-01-12 RX ORDER — SODIUM HYPOCHLORITE 1.25 MG/ML
SOLUTION TOPICAL 2 TIMES DAILY
Status: DISCONTINUED | OUTPATIENT
Start: 2022-01-12 | End: 2022-01-14 | Stop reason: HOSPADM

## 2022-01-12 RX ORDER — FENTANYL CITRATE 50 UG/ML
INJECTION, SOLUTION INTRAMUSCULAR; INTRAVENOUS
Status: COMPLETED | OUTPATIENT
Start: 2022-01-12 | End: 2022-01-12

## 2022-01-12 RX ORDER — SODIUM HYPOCHLORITE 1.25 MG/ML
SOLUTION TOPICAL ONCE
Status: COMPLETED | OUTPATIENT
Start: 2022-01-12 | End: 2022-01-12

## 2022-01-12 RX ORDER — DIPHENHYDRAMINE HCL 25 MG
25 CAPSULE ORAL EVERY 6 HOURS PRN
Status: DISCONTINUED | OUTPATIENT
Start: 2022-01-12 | End: 2022-01-14 | Stop reason: HOSPADM

## 2022-01-12 RX ORDER — MIDAZOLAM HYDROCHLORIDE 1 MG/ML
INJECTION INTRAMUSCULAR; INTRAVENOUS
Status: COMPLETED | OUTPATIENT
Start: 2022-01-12 | End: 2022-01-12

## 2022-01-12 RX ADMIN — LIDOCAINE HYDROCHLORIDE 30 ML: 10 INJECTION, SOLUTION EPIDURAL; INFILTRATION; INTRACAUDAL; PERINEURAL at 18:19

## 2022-01-12 RX ADMIN — MIDAZOLAM 1 MG: 1 INJECTION INTRAMUSCULAR; INTRAVENOUS at 15:28

## 2022-01-12 RX ADMIN — ENOXAPARIN SODIUM 40 MG: 40 INJECTION SUBCUTANEOUS at 18:20

## 2022-01-12 RX ADMIN — CLINDAMYCIN PHOSPHATE 900 MG: 900 INJECTION, SOLUTION INTRAVENOUS at 20:44

## 2022-01-12 RX ADMIN — POTASSIUM CHLORIDE 40 MEQ: 750 CAPSULE, EXTENDED RELEASE ORAL at 00:19

## 2022-01-12 RX ADMIN — FAMOTIDINE 10 MG: 20 TABLET, FILM COATED ORAL at 20:43

## 2022-01-12 RX ADMIN — TAZOBACTAM SODIUM AND PIPERACILLIN SODIUM 3.38 G: 375; 3 INJECTION, SOLUTION INTRAVENOUS at 17:39

## 2022-01-12 RX ADMIN — SODIUM CHLORIDE 2 G: 900 INJECTION INTRAVENOUS at 23:47

## 2022-01-12 RX ADMIN — TAZOBACTAM SODIUM AND PIPERACILLIN SODIUM 3.38 G: 375; 3 INJECTION, SOLUTION INTRAVENOUS at 04:22

## 2022-01-12 RX ADMIN — DAKIN'S SOLUTION 0.125% (QUARTER STRENGTH): 0.12 SOLUTION at 20:44

## 2022-01-12 RX ADMIN — LIDOCAINE HYDROCHLORIDE 5 ML: 10 INJECTION, SOLUTION EPIDURAL; INFILTRATION; INTRACAUDAL; PERINEURAL at 18:15

## 2022-01-12 RX ADMIN — SODIUM CHLORIDE, PRESERVATIVE FREE 10 ML: 5 INJECTION INTRAVENOUS at 20:44

## 2022-01-12 RX ADMIN — DAKIN'S SOLUTION 0.125% (QUARTER STRENGTH): 0.12 SOLUTION at 18:20

## 2022-01-12 RX ADMIN — ACETAMINOPHEN 650 MG: 325 TABLET, FILM COATED ORAL at 17:39

## 2022-01-12 RX ADMIN — FENTANYL CITRATE 50 MCG: 50 INJECTION, SOLUTION INTRAMUSCULAR; INTRAVENOUS at 15:29

## 2022-01-12 RX ADMIN — VANCOMYCIN HYDROCHLORIDE 1000 MG: 1 INJECTION, SOLUTION INTRAVENOUS at 01:33

## 2022-01-12 RX ADMIN — FAMOTIDINE 10 MG: 20 TABLET, FILM COATED ORAL at 09:10

## 2022-01-12 RX ADMIN — ROSUVASTATIN CALCIUM 20 MG: 20 TABLET, FILM COATED ORAL at 09:10

## 2022-01-12 RX ADMIN — SODIUM CHLORIDE, PRESERVATIVE FREE 10 ML: 5 INJECTION INTRAVENOUS at 09:13

## 2022-01-12 RX ADMIN — ACETAMINOPHEN 650 MG: 325 TABLET, FILM COATED ORAL at 07:26

## 2022-01-12 RX ADMIN — DAPTOMYCIN 350 MG: 500 INJECTION, POWDER, LYOPHILIZED, FOR SOLUTION INTRAVENOUS at 09:37

## 2022-01-12 NOTE — PROGRESS NOTES
[facility] Medicine Services  PROGRESS NOTE    Patient Name: Suze Vaca  : 1965  MRN: [id number]    Date of Admission: 2022  Primary Care Physician: Daniel Buckner MD    Subjective   Subjective     CC:  F/U cellulitis, abscess     HPI:  Patient seen and examined. RN notes reviewed. Fever overnight. Erythema improved today. Plan to have abscess drained today. States her breasts are tender from the rash.     ROS:  Gen- +fevers, no chills  CV- No chest pain, palpitations  Resp- No cough, dyspnea  GI- No N/V/D, abd pain    Objective   Objective     Vital Signs:   Temp:  [99.1 °F (37.3 °C)-103.2 °F (39.6 °C)] 99.1 °F (37.3 °C)  Heart Rate:  [] 83  Resp:  [16-20] 16  BP: (103-136)/(58-68) 103/60     Physical Exam:  Constitutional: No acute distress, awake, alert  HENT: NCAT, mucous membranes moist  Respiratory: Clear to auscultation bilaterally, respiratory effort normal   Cardiovascular: RRR, no murmurs, rubs, or gallops  Gastrointestinal: Positive bowel sounds, soft, nontender, nondistended  Musculoskeletal: No bilateral ankle edema  Psychiatric: Appropriate affect, cooperative  Neurologic: Oriented x 3, strength symmetric in all extremities, Cranial Nerves grossly intact to confrontation, speech clear  Skin: abdominal erythema improved, still with significant erythema over breasts, no drainage noted today    Results Reviewed:  LAB RESULTS:      Lab 22  0926 22  1222   WBC  --  15.33* 20.32*   HEMOGLOBIN  --  13.3 13.4   HEMATOCRIT  --  39.6 39.8   PLATELETS  --  262 296   NEUTROS ABS  --  13.72* 18.07*   IMMATURE GRANS (ABS)  --  0.07* 0.15*   LYMPHS ABS  --  0.60* 1.18   MONOS ABS  --  0.88 0.76   EOS ABS  --  0.03 0.12   MCV  --  92.1 93.0   PROCALCITONIN  --  0.77* 0.87*   LACTATE  --   --  1.8   PROTIME 14.6*  --   --          Lab 22  0445 22  1222   SODIUM 135* 132*   POTASSIUM 3.9 3.0*   CHLORIDE 101 93*   CO2 24.0  24.0   ANION GAP 10.0 15.0   BUN 13 13   CREATININE 0.79 0.64   GLUCOSE 119* 138*   CALCIUM 9.4 9.8   MAGNESIUM 2.0 2.0   HEMOGLOBIN A1C  --  6.20*         Lab 01/11/22  1222   TOTAL PROTEIN 7.8   ALBUMIN 4.10   GLOBULIN 3.7   ALT (SGPT) 12   AST (SGOT) 16   BILIRUBIN 0.4   ALK PHOS 80         Lab 01/12/22  0926   PROTIME 14.6*   INR 1.18*                 Brief Urine Lab Results  (Last result in the past 365 days)      Color   Clarity   Blood   Leuk Est   Nitrite   Protein   CREAT   Urine HCG        01/11/22 1342 Yellow   Clear   Trace   Negative   Negative   30 mg/dL (1+)                 Microbiology Results Abnormal     Procedure Component Value - Date/Time    Urine Culture - Urine, Urine, Clean Catch [[id number]]  (Normal) Collected: 01/11/22 1342    Lab Status: Preliminary result Specimen: Urine, Clean Catch Updated: 01/12/22 0913     Urine Culture Growth present, too young to evaluate    COVID PRE-OP / PRE-PROCEDURE SCREENING ORDER (NO ISOLATION) - Swab, Nasopharynx [[id number]]  (Normal) Collected: 01/11/22 1100    Lab Status: Final result Specimen: Swab from Nasopharynx Updated: 01/11/22 1156    Narrative:      The following orders were created for panel order COVID PRE-OP / PRE-PROCEDURE SCREENING ORDER (NO ISOLATION) - Swab, Nasopharynx.  Procedure                               Abnormality         Status                     ---------                               -----------         ------                     COVID-19 and FLU A/B PCR...[[id number]]  Normal              Final result                 Please view results for these tests on the individual orders.    COVID-19 and FLU A/B PCR - Swab, Nasopharynx [[id number]]  (Normal) Collected: 01/11/22 1100    Lab Status: Final result Specimen: Swab from Nasopharynx Updated: 01/11/22 1156     COVID19 Not Detected     Influenza A PCR Not Detected     Influenza B PCR Not Detected    Narrative:      Fact sheet for providers:  https://www.fda.gov/media/555020/download    Fact sheet for patients: https://www.fda.gov/media/423782/download    Test performed by [facility].          CT Abdomen Pelvis Without Contrast    Result Date: 1/11/2022  EXAMINATION: CT ABDOMEN AND PELVIS WO CONTRAST-01/11/2022:  INDICATION: Leukocytosis; R50.9-Fever, unspecified; D72.825-Bandemia; Z98.890-Other specified postprocedural states; R43.0-Anosmia.  TECHNIQUE: 5 mm unenhanced images through the abdomen and pelvis.  The radiation dose reduction device was turned on for each scan per the ALARA (As Low as Reasonably Achievable) protocol.  COMPARISON: NONE.  FINDINGS: The patient indicates leukocytosis, fever, the patient's chart indicates previous abdominoplasty and liposuction 12/13/2021.  There is diffuse subcutaneous fat stranding of the abdomen, from a level just above the umbilicus, inferiorly to several centimeters above the pubic symphysis. There is some low-density change, presumably small focal dense edema in the deep subcutaneous fat anterior to the premuscular fascia over a roughly 6 x 12 mm area, somewhat more cephalad, however, in the midline above the umbilicus, this appears to continue as a more well-defined rounded 2.5 cm collection with a single air bubble, axial images 34-37, potentially a small subcutaneous abscess. No potential inflammatory focus is identified elsewhere.  The remainder of the scan shows the included lower lungs to appear clear. There is diffuse fatty liver change. Gallbladder, spleen, pancreas, adrenal glands, and kidneys appear unremarkable for a non-IV contrast enhanced exam. No upper abdominal free air, ascites, adenopathy, or acute inflammatory focus is appreciated. There is no evidence of abdominal wall hernia.  In the pelvis, large and small bowel loops are normal in caliber and appearance. The terminal ileum and cecum appear normal. The appendix is identified with fairly good degree of confidence, images 61-55 and appears  normal. The bladder is moderately distended and normal in appearance. The uterus and ovaries are not enlarged. No intrapelvic free fluid or inflammatory.      Impression: 1. Focal inflammatory collection in the deep subcutaneous tissues of the abdominal midline, above the umbilicus, suspicious for small abscess. Diffuse subcutaneous fat stranding, which may represent an overlying cellulitis.  2. No evidence of intra-abdominal inflammatory focus or other intra-abdominal or intrapelvic disease elsewhere.  D:  01/11/2022 E:  01/11/2022  This report was finalized on 1/11/2022 9:22 PM by Dr. Oliver Sharma MD.      XR Chest 1 View    Result Date: 1/11/2022  EXAMINATION: XR CHEST 1 VW-01/11/2022:  INDICATION: Acute febrile illness; R50.9-Fever, unspecified; D72.825-Bandemia; Z98.890-Other specified postprocedural states; R43.0-Anosmia.  COMPARISON: NONE.  FINDINGS:  The heart, mediastinum and pulmonary vasculature appear within normal limits. The lungs appear normally expanded and initially grossly clear. Slight haziness of the lateral soft tissues of the right and left chest appears to be due to overlying breast tissue shadow. Groundglass changes of Covid-19 pneumonia would have a similar appearance and distribution however.      Impression: Hazy interstitial opacity at the lateral right and left mid and lower lungs. This may simply represent overlying breast tissue shadow. With history of dyspnea and fever, however, consider groundglass changes associated with Covid-19 pneumonia.  D:  01/11/2022 E:  01/11/2022     This report was finalized on 1/11/2022 8:59 PM by Dr. Oliver Sharma MD.            I have reviewed the medications:  Scheduled Meds:DAPTOmycin, 6 mg/kg (Adjusted), Intravenous, Q24H  enoxaparin, 40 mg, Subcutaneous, Q24H  famotidine, 10 mg, Oral, BID  insulin lispro, 0-7 Units, Subcutaneous, TID AC  multivitamin, 1 tablet, Oral, Daily  piperacillin-tazobactam, 3.375 g, Intravenous, Q8H  rosuvastatin, 20 mg, Oral,  Daily  sodium chloride, 10 mL, Intravenous, Q12H      Continuous Infusions:   PRN Meds:.•  acetaminophen **OR** acetaminophen **OR** acetaminophen  •  dextrose  •  dextrose  •  glucagon (human recombinant)  •  magnesium sulfate **OR** magnesium sulfate **OR** magnesium sulfate  •  Morphine **AND** naloxone  •  ondansetron **OR** ondansetron  •  potassium chloride **OR** potassium chloride **OR** potassium chloride  •  sodium chloride    Assessment/Plan   Assessment & Plan     Active Hospital Problems    Diagnosis  POA   • Acute febrile illness [R50.9]  Yes   • Sepsis, unspecified organism (HCC) [A41.9]  Unknown   • Abscess [L02.91]  Unknown   • Cellulitis of abdominal wall [L03.311]  Unknown   • S/P abdominoplasty [Z98.890]  Not Applicable   • Hypokalemia [E87.6]  Unknown   • Abnormal CXR [R93.89]  Unknown      Resolved Hospital Problems   No resolved problems to display.        Brief Hospital Course to date:  Suze Vaca is a 56 y.o. female with PMHx DM2, HTN, HLD, GERD, hx of bilateral breast reduction with excess abdominal skin. Patient had a recent hx of 40 lbs weight loss, felt she was at her ideal weight, and decided to proceed with abdominoplasty to remove excess abdominal skin with Dr Daniel Buckner 12/13/21. Patient did well with procedure, was kept overnight for observation, and discharged home the next day. She was given Ancef prior to surgery and was discharged home with 3 more days of Keflex which she completed.  She presents to ED today at direction of Dr Buckner due to rash. Patient states her rash started Sunday (1/9/22) night. It did not itch at first but is starting to itch now. She denies any new medications and the only change in her medications has been a decrease in her Jardiance from 25mg to 10mg. No blistering.   Patient has noticed some drainage from her incision.     Sepsis (fever, tachycardia, leukocytosis. Source: cellulitis/abscess)  Abdominal wall cellulitis/rash  Concern for small  abscess above umbilicus   Recent Abdominoplasty   -Dr Daniel Buckner, plastic surgery following  -IR to perform drainage of abscess today, will send for Cx  -Wound Cx on admission + Strep pyogenes   -ID following, continue Dapto and Zosyn   -Blood Cx x2 pending      Abnormal CXR  Fever  -CXR with possible Ground-Glass changes  -CT chest ordered   -Initial COVID test negative   -Pt isolated on admission until repeat has resulted but ok with ID to DC precautions   -Per patient, she is fully vaccinated (has not had booster)   -97% on RA      HTN  -Hold BP meds in setting of sepsis, resume as able  -BP remains low (103/60)     DM2  Glucosuria, Proteinuria   -Hold Jardiance, Metformin   -LDSSI for now   -Hga1c 6.2     GERD  -Continue Pepcid      HLD  -Continue Crestor      Hypokalemia  -Resolved with replacement      UTI  -Cx with growth, final pending  - ABX as above     DVT prophylaxis:  Medical DVT prophylaxis orders are present.       AM-PAC 6 Clicks Score (PT): 24 (01/11/22 1600)    Disposition: I expect the patient to be discharged TBD    CODE STATUS:   Code Status and Medical Interventions:   Ordered at: 01/11/22 1625     Level Of Support Discussed With:    Patient     Code Status (Patient has no pulse and is not breathing):    CPR (Attempt to Resuscitate)     Medical Interventions (Patient has pulse or is breathing):    Full Support       Zaida White, DO  01/12/22

## 2022-01-12 NOTE — CASE MANAGEMENT/SOCIAL WORK
Discharge Planning Assessment  [facility]     Patient Name: Suze Vaca  MRN: [id number]  Today's Date: 1/12/2022    Admit Date: 1/11/2022     Discharge Needs Assessment     Row Name 01/12/22 1613       Living Environment    Lives With spouse    Current Living Arrangements home/apartment/condo       Discharge Needs Assessment    Equipment Currently Used at Home none    Equipment Needed After Discharge none               Discharge Plan     Row Name 01/12/22 1614       Plan    Plan Home at DC    Patient/Family in Agreement with Plan unable to assess    Plan Comments I could not reach the pt by phone then off the floor for procedure. Info from the chart. No DC needs identified at this time.    Final Discharge Disposition Code 01 - home or self-care              Continued Care and Services - Admitted Since 1/11/2022    Coordination has not been started for this encounter.       Expected Discharge Date and Time     Expected Discharge Date Expected Discharge Time    Jan 14, 2022          Demographic Summary    No documentation.                Functional Status     Row Name 01/12/22 1613       Functional Status    Usual Activity Tolerance good       Functional Status, IADL    Medications independent    Meal Preparation independent    Housekeeping independent    Laundry independent    Shopping independent               Psychosocial    No documentation.                Abuse/Neglect    No documentation.                Legal    No documentation.                Substance Abuse    No documentation.                Patient Forms    No documentation.                   Luz Sanderson RN

## 2022-01-12 NOTE — NURSING NOTE
US guided abscess drain placed by MD Pete. 8.5 Maltese locking drain to bulb suction placed; with 20 mls nini/straw fluid removed. Labs obtained. Sedation time of 13 minutes, 1 mg Versed and 50 mcg Fentanyl given. Patient tolerated well. Report called to nurse.

## 2022-01-12 NOTE — PLAN OF CARE
Problem: Adult Inpatient Plan of Care  Goal: Plan of Care Review  Outcome: Ongoing, Progressing  Flowsheets  Taken 1/12/2022 1849 by Nimo Tiwari RN  Outcome Summary: Pt had abscess drained. Doctor is having wound on left side packed with Dakins 3 times a day. Pt has been running temp. and received Tylenol.  Taken 1/12/2022 0651 by Rosalie Landin RN  Progress: improving  Plan of Care Reviewed With: patient  Goal: Patient-Specific Goal (Individualized)  Outcome: Ongoing, Progressing  Goal: Absence of Hospital-Acquired Illness or Injury  Outcome: Ongoing, Progressing  Intervention: Identify and Manage Fall Risk  Flowsheets  Taken 1/12/2022 1800  Safety Promotion/Fall Prevention:   activity supervised   assistive device/personal items within reach   clutter free environment maintained   nonskid shoes/slippers when out of bed   safety round/check completed  Taken 1/12/2022 1605  Safety Promotion/Fall Prevention: (pt returned to floor)   activity supervised   assistive device/personal items within reach   clutter free environment maintained   nonskid shoes/slippers when out of bed   safety round/check completed  Taken 1/12/2022 1400  Safety Promotion/Fall Prevention: patient off unit  Taken 1/12/2022 1205  Safety Promotion/Fall Prevention: patient off unit  Taken 1/12/2022 1000  Safety Promotion/Fall Prevention:   activity supervised   assistive device/personal items within reach   clutter free environment maintained   nonskid shoes/slippers when out of bed   safety round/check completed  Taken 1/12/2022 0720  Safety Promotion/Fall Prevention:   activity supervised   assistive device/personal items within reach   clutter free environment maintained   nonskid shoes/slippers when out of bed  Intervention: Prevent Skin Injury  Flowsheets  Taken 1/12/2022 1800 by Nimo Tiwari, RN  Body Position: position changed independently  Taken 1/12/2022 1605 by Nimo Tiwari, RN  Body Position: position changed  independently  Taken 1/12/2022 1000 by Nimo Tiwari, RN  Body Position: position changed independently  Taken 1/12/2022 0600 by Rosalie Landin RN  Skin Protection:   transparent dressing maintained   tubing/devices free from skin contact  Intervention: Prevent and Manage VTE (venous thromboembolism) Risk  Flowsheets (Taken 1/12/2022 0720)  VTE Prevention/Management:   bilateral   dorsiflexion/plantar flexion performed  Intervention: Prevent Infection  Flowsheets  Taken 1/12/2022 1800  Infection Prevention:   rest/sleep promoted   single patient room provided   visitors restricted/screened  Taken 1/12/2022 1605  Infection Prevention:   rest/sleep promoted   single patient room provided   visitors restricted/screened  Taken 1/12/2022 1000  Infection Prevention:   rest/sleep promoted   single patient room provided   visitors restricted/screened  Taken 1/12/2022 0720  Infection Prevention:   rest/sleep promoted   single patient room provided   visitors restricted/screened  Goal: Optimal Comfort and Wellbeing  Outcome: Ongoing, Progressing  Intervention: Provide Person-Centered Care  Flowsheets (Taken 1/12/2022 0720)  Trust Relationship/Rapport:   care explained   questions answered   reassurance provided   thoughts/feelings acknowledged  Goal: Readiness for Transition of Care  Outcome: Ongoing, Progressing  Intervention: Mutually Develop Transition Plan  Flowsheets  Taken 1/12/2022 1613 by Luz Sanderson RN  Equipment Needed After Discharge: none  Equipment Currently Used at Home: none  Taken 1/11/2022 1724 by Nimo Tiwari, RN  Transportation Anticipated: car, drives self  Patient/Family Anticipated Services at Transition: none  Patient/Family Anticipates Transition to: home   Goal Outcome Evaluation:              Outcome Summary: Pt had abscess drained. Doctor is having wound on left side packed with Dakins 3 times a day. Pt has been running temp. and received Tylenol.

## 2022-01-12 NOTE — PROCEDURES
US Guided aspiration of abdominal wall fluid collection and placement of a drainage catheter.                                                                                                                  History: Postop infected seroma in the anterior abdominal wall                                                    : Derrick Pete MD.    Assistant:  None.                                                                                  Modality: Ultrasound.                                                                                             SEDATION: Moderate sedation was administered. 1 milligram of Versed and 50 micrograms of fentanyl IV was used for moderate sedation monitored under my direction. Total intra service time of sedation was 13 minutes. The patient's vital signs were monitored throughout the procedure and recorded in the patient's medical record by the nurse.    Anesthesia: 1% lidocaine.                Estimated blood loss:  < 5 cc.             Technique:   A thorough discussion of the risks, benefits, and alternatives of the procedure, and if applicable, moderate sedation was carried out with the patient or the patient's next of kin. Any questions were answered. They verbalized understanding. A written informed consent was then signed.      A timeout was performed prior to starting the procedure.     The procedure room personnel used personal protective equipment. The operators used sterile gowns and gloves in addition. The surgical site was prepped with chlorhexidine gluconate and draped in the maximal sterile fashion.    A preliminary ultrasonography was performed to assess the target and determine a safe access site. It showed an anterior abdominal wall fluid collection sandwiched between the fatty and muscular layer with one large epigastric pocket that continues into a craniocaudad rectangular layer of fluid in the left anterior abdominal wall.  Pertinent ultrasound  images were secured to the PACS for documentation.     A supraumbilical midline access site was selected and sterilely prepped and draped. Local anesthesia was administered.     Using aseptic precautions, real-time ultrasonographic guidance, an 8.5 Kazakh locking pigtail catheter was advanced into the target in a single stick trocar-cannula technique followed by performance of aspiration. Pus dark straw-colored fluid was recovered. Approximately 20 cc of fluid was aspirated and sent to the lab for examination.    The catheter was secured to skin with nonabsorbable suture, StatLock and Tegaderm.    After recovery, the patient was discharged from the department in stable condition.    Complications: None immediate.    Specimen: Sent to the lab                                                                         Impression:                 Successful ultrasound-guided aspiration and drainage of a postop seroma in the anterior abdominal wall.    Thank you for the opportunity to assist in the care of your patient.

## 2022-01-12 NOTE — PROGRESS NOTES
Plastic Surgery Progress Note      Admission Date:  2022  LOS:  1  Patient Care Team:  Daniel Buckner MD as PCP - General (Plastic Surgery)  Daniel Buckner MD as Consulting Physician (Plastic Surgery)    Patient Name:  Suze Vaca  :  1965  MRN:  [id number]    Date:  2022      Subjective:    Vital stable, febrile overnight. Patient states continued fevers and chills with sweats.     History:   Past Medical History:   Diagnosis Date   • Diabetes mellitus (HCC)    • GERD (gastroesophageal reflux disease)    • Hyperlipidemia    • Hypertension    • Wears glasses      Past Surgical History:   Procedure Laterality Date   • ABDOMINOPLASTY N/A 2021    Procedure: ABDOMINOPLASTY WITH LIPOSUCTION;  Surgeon: Daniel Buckner MD;  Location: [facility];  Service: Plastics;  Laterality: N/A;   • REDUCTION MAMMAPLASTY Bilateral    • VAGINAL DELIVERY       History reviewed. No pertinent family history.  Social History     Socioeconomic History   • Marital status:    Tobacco Use   • Smoking status: Never Smoker   • Smokeless tobacco: Never Used   Vaping Use   • Vaping Use: Never used   Substance and Sexual Activity   • Alcohol use: Yes     Alcohol/week: 1.0 standard drink     Types: 1 Glasses of wine per week     Comment: SOCAILLY   • Drug use: Never     No Known Allergies    Medication:    Current Facility-Administered Medications:   •  [START ON 2022] !Vancomycin level ordered  w/ AM labs. Please hold  1200 dose until pharmacy evaluates, , Does not apply, Once, Zachery Clifford, [facility]  •  acetaminophen (TYLENOL) tablet 650 mg, 650 mg, Oral, Q4H PRN, 650 mg at 22 0726 **OR** acetaminophen (TYLENOL) 160 MG/5ML solution 650 mg, 650 mg, Oral, Q4H PRN **OR** acetaminophen (TYLENOL) suppository 650 mg, 650 mg, Rectal, Q4H PRN, Zaida White, DO  •  dextrose (D50W) (25 g/50 mL) IV injection 25 g, 25 g, Intravenous, Q15 Min PRN, Zaida White, DO  •  dextrose (GLUTOSE) oral  gel 15 g, 15 g, Oral, Q15 Min PRN, Zaida White, DO  •  enoxaparin (LOVENOX) syringe 40 mg, 40 mg, Subcutaneous, Q24H, Zaida White,   •  famotidine (PEPCID) tablet 10 mg, 10 mg, Oral, BID, Zaida White, , 10 mg at 01/11/22 2122  •  glucagon (human recombinant) (GLUCAGEN DIAGNOSTIC) injection 1 mg, 1 mg, Subcutaneous, Q15 Min PRN, Zaida White, DO  •  insulin lispro (humaLOG) injection 0-7 Units, 0-7 Units, Subcutaneous, TID AC, Zaida White, DO  •  Magnesium Sulfate 2 gram Bolus, followed by 8 gram infusion (total Mg dose 10 grams)- Mg less than or equal to 1mg/dL, 2 g, Intravenous, PRN **OR** Magnesium Sulfate 2 gram / 50mL Infusion (GIVE X 3 BAGS TO EQUAL 6GM TOTAL DOSE) - Mg 1.1 - 1.5 mg/dl, 2 g, Intravenous, PRN **OR** Magnesium Sulfate 4 gram infusion- Mg 1.6-1.9 mg/dL, 4 g, Intravenous, PRN, Zaida White, DO  •  morphine injection 1 mg, 1 mg, Intravenous, Q4H PRN **AND** naloxone (NARCAN) injection 0.4 mg, 0.4 mg, Intravenous, Q5 Min PRN, Zaida White, DO  •  multivitamin with minerals 2 tablet, 2 tablet, Oral, Daily, Zaida White DO  •  ondansetron (ZOFRAN) tablet 4 mg, 4 mg, Oral, Q6H PRN **OR** ondansetron (ZOFRAN) injection 4 mg, 4 mg, Intravenous, Q6H PRN, Zaida White,   •  Pharmacy to dose vancomycin, , Does not apply, Continuous PRN, Zaida White,   •  piperacillin-tazobactam (ZOSYN) 3.375 g in iso-osmotic dextrose 50 ml (premix), 3.375 g, Intravenous, Q8H, Zaida White, , 3.375 g at 01/12/22 0422  •  potassium chloride (MICRO-K) CR capsule 40 mEq, 40 mEq, Oral, PRN, 40 mEq at 01/12/22 0019 **OR** potassium chloride (KLOR-CON) packet 40 mEq, 40 mEq, Oral, PRN **OR** potassium chloride 10 mEq in 100 mL IVPB, 10 mEq, Intravenous, Q1H PRN, Zaida White DO  •  rosuvastatin (CRESTOR) tablet 20 mg, 20 mg, Oral, Daily, Zaida White DO, 20 mg at 01/11/22 1705  •  sodium chloride 0.9 % flush 10 mL, 10 mL, Intravenous,  Q12H, Zaida White DO, 10 mL at 22 2122  •  sodium chloride 0.9 % flush 10 mL, 10 mL, Intravenous, PRN, Zaida White DO  •  vancomycin (VANCOCIN) in iso-osmotic dextrose IVPB 1 g (premix) 200 mL, 1,000 mg, Intravenous, Q12H, Zachery Clifford RPH, 1,000 mg at 22 0133    Antibiotics:  Anti-Infectives (From admission, onward)    Ordered     Dose/Rate Route Frequency Start Stop    22 1705  vancomycin (VANCOCIN) in iso-osmotic dextrose IVPB 1 g (premix) 200 mL        Ordering Provider: Zachery Clifford RPTAYLOR    1,000 mg  over 60 Minutes Intravenous Every 12 Hours 22 0000 01/15/22 2359    22 1628  piperacillin-tazobactam (ZOSYN) 3.375 g in iso-osmotic dextrose 50 ml (premix)        Ordering Provider: Zaida White DO    3.375 g  over 4 Hours Intravenous Every 8 Hours 22 19522 1628  Pharmacy to dose vancomycin        Ordering Provider: Zaida White DO     Does not apply Continuous PRN 22 1628 22 1627    22 1332  piperacillin-tazobactam (ZOSYN) 3.375 g in iso-osmotic dextrose 50 ml (premix)        Ordering Provider: Naveen Ulloa PA    3.375 g  over 30 Minutes Intravenous Once 22 1334 22 1420    22 1332  vancomycin 1250 mg/250 mL 0.9% NS IVPB ([facility])        Ordering Provider: Naveen Ulloa PA    20 mg/kg × 68 kg Intravenous Once 22 1334 22 1420            Objective:    Physical Exam:   Vital Signs:  Temp (24hrs), Av.9 °F (39.4 °C), Min:102.2 °F (39 °C), Max:103.2 °F (39.6 °C)    Temp  Min: 102.2 °F (39 °C)  Max: 103.2 °F (39.6 °C)  BP  Min: 113/59  Max: 136/58  Pulse  Min: 92  Max: 112  Resp  Min: 14  Max: 20  SpO2  Min: 96 %  Max: 99 %       GENERAL: Awake and alert, in no acute distress.   HEENT: Normocephalic, atraumatic.  EOMI. No conjunctival injection. No icterus.   HEART: RRR;   LUNGS:  Normal respiratory effort. Nonlabored. No dullness.     ABDOMEN: Soft, nontender,  nondistended. No rebound or guarding. NO mass or HSM. Improved skin color erythema/rash appears to be dissipating  EXT:  No cyanosis, clubbing or edema. No cord.        Laboratory Data:  Results from last 7 days   Lab Units 22  0445 22  1222   WBC 10*3/mm3 15.33* 20.32*   HEMOGLOBIN g/dL 13.3 13.4   HEMATOCRIT % 39.6 39.8   PLATELETS 10*3/mm3 262 296     Results from last 7 days   Lab Units 22  0445   SODIUM mmol/L 135*   POTASSIUM mmol/L 3.9   CHLORIDE mmol/L 101   CO2 mmol/L 24.0   BUN mg/dL 13   CREATININE mg/dL 0.79   GLUCOSE mg/dL 119*   CALCIUM mg/dL 9.4     Results from last 7 days   Lab Units 22  1222   ALK PHOS U/L 80   BILIRUBIN mg/dL 0.4   ALT (SGPT) U/L 12   AST (SGOT) U/L 16             Results from last 7 days   Lab Units 22  1222   LACTATE mmol/L 1.8             Estimated Creatinine Clearance: 74.4 mL/min (by C-G formula based on SCr of 0.79 mg/dL).    Microbiology:  No results found for: ACANTHNAEG, AFBCX, BPERTUSSISCX, BLOODCX  No results found for: BCIDPCR, CXREFLEX, CSFCX, CULTURETIS  No results found for: CULTURES, HSVCX, URCX  No results found for: EYECULTURE, GCCX, HSVCULTURE, LABHSV  No results found for: LEGIONELLA, MRSACX, MUMPSCX, MYCOPLASCX  No results found for: NOCARDIACX, STOOLCX  No results found for: THROATCX, UNSTIMCULT, URINECX, CULTURE, VZVCULTUR  No results found for: VIRALCULTU, WOUNDCX      Assessment: 55yoF  hx DM2, HTN, HLD, GERD, s/p 21 abdominoplasty with acute onset of fevers, chills, nausea, vomiting and anterior trunk rash     Plan:  - again based on PE and hx concerned for underlying abscess causing a cellulitis but unusual spread and pattern of distribution on to the breasts and extending to the back improving with abx. I am concerned for something else underlying.   radiology and  interventional radiology both believe it can be aspirated, cultured and drain placement which would be best based on location. Will hold off on surgery  unless patient worsens or become unstable.   - continue Abx  - recommend ID consult  - IV fluids  - repeat AM labs  - NPO till IR  - Discussed case with Dr. Derrick Pete who plans on drainage today, keep NPO for the procedure  - will continue to follow       Daniel Buckner MD  01/12/22  07:32 EST

## 2022-01-12 NOTE — CONSULTS
INFECTIOUS DISEASE CONSULT/INITIAL HOSPITAL VISIT    Suze Vaca  1965  [id number]    Date of Consult: 1/12/2022    Admission Date: 1/11/2022      Requesting Provider: No ref. provider found  Evaluating Physician: John Kay MD    Reason for Consultation: abdominal wall abscess    History of present illness:    1/12/22: Patient is a 56 y.o. female, PMH DM, HTN, seen today for an abdominal wall abscess. She underwent an abdominoplasty on 12/13/21.  She had been doing well until Sunday, 1/9/22 when she noted some drainage from her abdominal incision, and then developed a truncal rash. She then developed a fever, notified Dr. Buckner's office and was referred the ED. An abdominal CT revealing focal inflammatory collection in the deep subcutaneous tissues of the abdominal midline, above the umbilicus, suspicious for a small abscess, with diffuse subcutaneous fat stranding which may represent an overlying cellulitis. CXR with hazy interstitial opacity at the lateral right and left mid and lower lungs.  Admitting labs negative COVID PCR, WBC 20.3, PC T 0.87, lactate 1.8, Scr 0.64, K 3.0, and Tmax of 103.2.  She was started on Vancomycin, and Zosyn and we were consulted for evaluation and treatment. She has not used any new soaps, skin products, no new medications.  She has received 2 doses of Moderna vaccine but not her booster. She now has 2 negative Covid 19 PCR studies.   Her abdominal wall culture has grown group A streptococcus.  Her abdominal wall ultrasound reveals a more extensive upper abdominal wall abscess.  She is undergoing ultrasound-guided drainage of the abscess at the present time.    Past Medical History:   Diagnosis Date   • Diabetes mellitus (HCC)    • GERD (gastroesophageal reflux disease)    • Hyperlipidemia    • Hypertension    • Wears glasses        Past Surgical History:   Procedure Laterality Date   • ABDOMINOPLASTY N/A 12/13/2021    Procedure: ABDOMINOPLASTY WITH  LIPOSUCTION;  Surgeon: Daniel Buckner MD;  Location: [facility];  Service: Plastics;  Laterality: N/A;   • REDUCTION MAMMAPLASTY Bilateral    • VAGINAL DELIVERY         History reviewed. No pertinent family history.    Social History     Socioeconomic History   • Marital status:    Tobacco Use   • Smoking status: Never Smoker   • Smokeless tobacco: Never Used   Vaping Use   • Vaping Use: Never used   Substance and Sexual Activity   • Alcohol use: Yes     Alcohol/week: 1.0 standard drink     Types: 1 Glasses of wine per week     Comment: SOCAILLY   • Drug use: Never       No Known Allergies      Medication:    Current Facility-Administered Medications:   •  acetaminophen (TYLENOL) tablet 650 mg, 650 mg, Oral, Q4H PRN, 650 mg at 01/12/22 1739 **OR** acetaminophen (TYLENOL) 160 MG/5ML solution 650 mg, 650 mg, Oral, Q4H PRN **OR** acetaminophen (TYLENOL) suppository 650 mg, 650 mg, Rectal, Q4H PRN, Zaida White DO  •  [START ON 1/13/2022] cefTRIAXone (ROCEPHIN) 2 g/100 mL 0.9% NS IVPB (MBP), 2 g, Intravenous, Q24H, John Kay MD  •  clindamycin (CLEOCIN) 900 mg in dextrose 5% 50 mL IVPB (premix), 900 mg, Intravenous, Q8H, John Kay MD  •  dextrose (D50W) (25 g/50 mL) IV injection 25 g, 25 g, Intravenous, Q15 Min PRN, Zaida White DO  •  dextrose (GLUTOSE) oral gel 15 g, 15 g, Oral, Q15 Min PRN, Zaida White DO  •  enoxaparin (LOVENOX) syringe 40 mg, 40 mg, Subcutaneous, Q24H, Zaida White DO  •  famotidine (PEPCID) tablet 10 mg, 10 mg, Oral, BID, Zaida White DO, 10 mg at 01/12/22 0910  •  glucagon (human recombinant) (GLUCAGEN DIAGNOSTIC) injection 1 mg, 1 mg, Subcutaneous, Q15 Min PRN, Zaida White DO  •  insulin lispro (humaLOG) injection 0-7 Units, 0-7 Units, Subcutaneous, TID AC, Zaida Whiet, DO  •  lidocaine (XYLOCAINE) 1 % injection 30 mL, 30 mL, Infiltration, Once, Daniel Buckner MD  •  Magnesium Sulfate 2 gram Bolus, followed by 8 gram  infusion (total Mg dose 10 grams)- Mg less than or equal to 1mg/dL, 2 g, Intravenous, PRN **OR** Magnesium Sulfate 2 gram / 50mL Infusion (GIVE X 3 BAGS TO EQUAL 6GM TOTAL DOSE) - Mg 1.1 - 1.5 mg/dl, 2 g, Intravenous, PRN **OR** Magnesium Sulfate 4 gram infusion- Mg 1.6-1.9 mg/dL, 4 g, Intravenous, PRN, Zaida White, DO  •  morphine injection 1 mg, 1 mg, Intravenous, Q4H PRN **AND** naloxone (NARCAN) injection 0.4 mg, 0.4 mg, Intravenous, Q5 Min PRN, Zaida White, DO  •  multivitamin (THERAGRAN) tablet 1 tablet, 1 tablet, Oral, Daily, Bhavana Ross, PharmD  •  ondansetron (ZOFRAN) tablet 4 mg, 4 mg, Oral, Q6H PRN **OR** ondansetron (ZOFRAN) injection 4 mg, 4 mg, Intravenous, Q6H PRN, Zaida White, DO  •  potassium chloride (MICRO-K) CR capsule 40 mEq, 40 mEq, Oral, PRN, 40 mEq at 01/12/22 0019 **OR** potassium chloride (KLOR-CON) packet 40 mEq, 40 mEq, Oral, PRN **OR** potassium chloride 10 mEq in 100 mL IVPB, 10 mEq, Intravenous, Q1H PRN, Zaida White, DO  •  rosuvastatin (CRESTOR) tablet 20 mg, 20 mg, Oral, Daily, Zaida White DO, 20 mg at 01/12/22 0910  •  sodium chloride 0.9 % flush 10 mL, 10 mL, Intravenous, Q12H, Zaida White, , 10 mL at 01/12/22 0913  •  sodium chloride 0.9 % flush 10 mL, 10 mL, Intravenous, PRN, Zaida White, DO  •  sodium hypochlorite (DAKIN'S 1/4 STRENGTH) 0.125 % topical solution 0.125% solution, , Topical, BID, Daniel Buckner MD  •  sodium hypochlorite (DAKIN'S 1/4 STRENGTH) 0.125 % topical solution 0.125% solution, , Topical, Once, Daniel Buckner MD    Antibiotics:  Anti-Infectives (From admission, onward)    Ordered     Dose/Rate Route Frequency Start Stop    01/12/22 1815  cefTRIAXone (ROCEPHIN) 2 g/100 mL 0.9% NS IVPB (MBP)        Ordering Provider: John Kay MD    2 g  over 30 Minutes Intravenous Every 24 Hours 01/13/22 0000 01/22/22 2359    01/12/22 1816  clindamycin (CLEOCIN) 900 mg in dextrose 5% 50 mL IVPB (premix)         Ordering Provider: John Kay MD    900 mg  50 mL/hr over 60 Minutes Intravenous Every 8 Hours 22 [id number] 1332  piperacillin-tazobactam (ZOSYN) 3.375 g in iso-osmotic dextrose 50 ml (premix)        Ordering Provider: Naveen Ulloa PA    3.375 g  over 30 Minutes Intravenous Once 22 1334 22 1420    22 1332  vancomycin 1250 mg/250 mL 0.9% NS IVPB ([facility])        Ordering Provider: Naveen Ulloa PA    20 mg/kg × 68 kg Intravenous Once 22 1334 22 1420            Review of Systems:  Constitutional-- +Fever, chills or sweats.  Appetite good, and no malaise. No fatigue.  HEENT-- No new vision, hearing or throat complaints.  No epistaxis or oral sores.  Denies odynophagia or dysphagia. No headache, photophobia or neck stiffness.  CV-- No chest pain, palpitation or syncope  Resp-- No SOB/cough/Hemoptysis  GI- No nausea, vomiting, or diarrhea.  No hematochezia, melena, or hematemesis. Denies jaundice or chronic liver disease.  -- No dysuria, hematuria, or flank pain.  Denies hesitancy, urgency or flank pain.  Lymph- no swollen lymph nodes in neck/axilla or groin.   Heme- No active bruising or bleeding; no Hx of DVT or PE.  MS-- no swelling or pain in the bones or joints of arms/legs.  No new back pain.  Neuro-- No acute focal weakness or numbness in the arms or legs.  No seizures.  Skin--diffuse truncal rash - abdominal incision well approximated      Physical Exam:   Vital Signs  Temp (24hrs), Av.5 °F (38.6 °C), Min:99.1 °F (37.3 °C), Max:103.2 °F (39.6 °C)    Temp  Min: 99.1 °F (37.3 °C)  Max: 103.2 °F (39.6 °C)  BP  Min: 103/60  Max: 151/69  Pulse  Min: 83  Max: 101  Resp  Min: 16  Max: 20  SpO2  Min: 95 %  Max: 100 %    GENERAL: Awake and alert, in no acute distress.   HEENT: Normocephalic, atraumatic.  PERRL. EOMI. No conjunctival injection. No icterus. Oropharynx clear without evidence of thrush or exudate. No evidence of peridontal  disease.    NECK: Supple without nuchal rigidity. No mass.  LYMPH: No cervical, axillary or inguinal lymphadenopathy.  HEART: RRR; No murmur, rubs, gallops.   LUNGS: Clear to auscultation bilaterally without wheezing, rales, rhonchi. Normal respiratory effort. Nonlabored. No dullness.  ABDOMEN: Soft, nontender, nondistended. Positive bowel sounds. No rebound or guarding. NO mass or HSM. She has extensive erythema over the entire abdominal wall.  There is a scab over the left lateral lower abdominal incision from which I can express some thick purulent material but only a few drops were expressed.  EXT:  No cyanosis, clubbing or edema. No cord.  :  Without Singh catheter.  MSK:  No joint effusions   SKIN: Warm and dry .  Erythema from breast to groins, faint rash on back. No drainage from incision.   NEURO: Oriented to PPT.motor strength 5/5.  PSYCHIATRIC: Normal insight and judgement. Cooperative with PE    Laboratory Data    Results from last 7 days   Lab Units 01/12/22  0445 01/11/22  1222   WBC 10*3/mm3 15.33* 20.32*   HEMOGLOBIN g/dL 13.3 13.4   HEMATOCRIT % 39.6 39.8   PLATELETS 10*3/mm3 262 296     Results from last 7 days   Lab Units 01/12/22  0445   SODIUM mmol/L 135*   POTASSIUM mmol/L 3.9   CHLORIDE mmol/L 101   CO2 mmol/L 24.0   BUN mg/dL 13   CREATININE mg/dL 0.79   GLUCOSE mg/dL 119*   CALCIUM mg/dL 9.4     Results from last 7 days   Lab Units 01/11/22  1222   ALK PHOS U/L 80   BILIRUBIN mg/dL 0.4   ALT (SGPT) U/L 12   AST (SGOT) U/L 16             Results from last 7 days   Lab Units 01/11/22  1222   LACTATE mmol/L 1.8     Results from last 7 days   Lab Units 01/12/22  0445   CK TOTAL U/L 39         Estimated Creatinine Clearance: 74.4 mL/min (by C-G formula based on SCr of 0.79 mg/dL).      Microbiology:  No results found for: ACANTHNAEG, AFBCX, BPERTUSSISCX, BLOODCX  No results found for: BCIDPCR, CXREFLEX, CSFCX, CULTURETIS  No results found for: CULTURES, HSVCX, URCX  No results found for:  EYECULTURE, GCCX, HSVCULTURE, LABHSV  No results found for: LEGIONELLA, MRSACX, MUMPSCX, MYCOPLASCX  No results found for: NOCARDIACX, STOOLCX  No results found for: THROATCX, UNSTIMCULT, URINECX, CULTURE, VZVCULTUR  No results found for: VIRALCULTU, WOUNDCX        Radiology:  Imaging Results (Last 72 Hours)     Procedure Component Value Units Date/Time    US Guided Abscess Drain Abdomen/Pelvis [[id number]] Collected: 01/12/22 1550     Updated: 01/12/22 1610    Narrative:      US Guided aspiration of abdominal wall fluid collection and placement of  a drainage catheter.                                                                                                                   History: Postop infected seroma in the anterior abdominal wall                                                     : Derrick Pete MD.     Assistant:  None.                                                                                   Modality: Ultrasound.                                                                                              SEDATION: Moderate sedation was administered. 1 milligram of Versed and  50 micrograms of fentanyl IV was used for moderate sedation monitored  under my direction. Total intra service time of sedation was 13 minutes.  The patient's vital signs were monitored throughout the procedure and  recorded in the patient's medical record by the nurse.     Anesthesia: 1% lidocaine.                 Estimated blood loss:  < 5 cc.              Technique:   A thorough discussion of the risks, benefits, and alternatives of the  procedure, and if applicable, moderate sedation was carried out with the  patient or the patient's next of kin. Any questions were answered. They  verbalized understanding. A written informed consent was then signed.       A timeout was performed prior to starting the procedure.      The procedure room personnel used personal protective equipment. The  operators used  sterile gowns and gloves in addition. The surgical site  was prepped with chlorhexidine gluconate and draped in the maximal  sterile fashion.     A preliminary ultrasonography was performed to assess the target and  determine a safe access site. It showed an anterior abdominal wall fluid  collection sandwiched between the fatty and muscular layer with one  large epigastric pocket that continues into a craniocaudad rectangular  layer of fluid in the left anterior abdominal wall.  Pertinent  ultrasound images were secured to the PACS for documentation.      A supraumbilical midline access site was selected and sterilely prepped  and draped. Local anesthesia was administered.      Using aseptic precautions, real-time ultrasonographic guidance, an 8.5  Dominican locking pigtail catheter was advanced into the target in a single  stick trocar-cannula technique followed by performance of aspiration.  Pus dark straw-colored fluid was recovered. Approximately 20 cc of fluid  was aspirated and sent to the lab for examination.     The catheter was secured to skin with nonabsorbable suture, StatLock and  Tegaderm.     After recovery, the patient was discharged from the department in stable  condition.     Complications: None immediate.     Specimen: Sent to the lab                                                                           Impression:      Impression:                 Successful ultrasound-guided aspiration and drainage of a postop seroma  in the anterior abdominal wall.     Thank you for the opportunity to assist in the care of your patient.     This report was finalized on 1/12/2022 4:00 PM by Derrick Pete MD.       CT Chest Without Contrast Diagnostic [[id number]] Collected: 01/12/22 1333     Updated: 01/12/22 1340    Narrative:      EXAMINATION: CT CHEST WO CONTRAST DIAGNOSTIC-      INDICATION: sepsis, concern for GGO on CXR; R50.9-Fever, unspecified;  D72.825-Bandemia; Z98.890-Other specified postprocedural  states;  R43.0-Anosmia     TECHNIQUE: Axial noncontrast CT of the chest with multiplanar  reconstruction     The radiation dose reduction device was turned on for each scan per the  ALARA (As Low as Reasonably Achievable) protocol.     COMPARISON: NONE     FINDINGS: Nonspecific edema and lymphadenopathy is present and bilateral  axillary regions, with largest discrete lymph node measuring 2.0 cm long  axis on the right, 18 mm on the left. No acute findings in the partially  imaged upper abdomen. No pleural or pericardial effusion. No pathologic  mediastinal or hilar lymphadenopathy. Evaluation of the osseous  structures demonstrates no evidence of acute fracture or aggressive  osseous lesion. Mildly atherosclerotic nonaneurysmal thoracic aorta.  Evaluation of the lung fields demonstrates no evidence of acute  infectious or inflammatory process. There are no suspicious pulmonary  nodules.       Impression:      Nonspecific and fairly symmetric bilateral axillary  lymphadenopathy. Finding is of uncertain clinical significance.     Otherwise essentially normal noncontrast CT of the chest. Specifically  there is no evidence of significant groundglass opacity or other  findings to suggest acute infectious or inflammatory process. There is  no suspicious pulmonary nodularity.        This report was finalized on 1/12/2022 1:37 PM by Nick Hamilton.       CT Abdomen Pelvis Without Contrast [[id number]] Collected: 01/11/22 1519     Updated: 01/11/22 2125    Narrative:      EXAMINATION: CT ABDOMEN AND PELVIS WO CONTRAST-01/11/2022:      INDICATION: Leukocytosis; R50.9-Fever, unspecified; D72.825-Bandemia;  Z98.890-Other specified postprocedural states; R43.0-Anosmia.     TECHNIQUE: 5 mm unenhanced images through the abdomen and pelvis.     The radiation dose reduction device was turned on for each scan per the  ALARA (As Low as Reasonably Achievable) protocol.     COMPARISON: NONE.     FINDINGS: The patient indicates  leukocytosis, fever, the patient's chart  indicates previous abdominoplasty and liposuction 12/13/2021.     There is diffuse subcutaneous fat stranding of the abdomen, from a level  just above the umbilicus, inferiorly to several centimeters above the  pubic symphysis. There is some low-density change, presumably small  focal dense edema in the deep subcutaneous fat anterior to the  premuscular fascia over a roughly 6 x 12 mm area, somewhat more  cephalad, however, in the midline above the umbilicus, this appears to  continue as a more well-defined rounded 2.5 cm collection with a single  air bubble, axial images 34-37, potentially a small subcutaneous  abscess. No potential inflammatory focus is identified elsewhere.     The remainder of the scan shows the included lower lungs to appear  clear. There is diffuse fatty liver change. Gallbladder, spleen,  pancreas, adrenal glands, and kidneys appear unremarkable for a non-IV  contrast enhanced exam. No upper abdominal free air, ascites,  adenopathy, or acute inflammatory focus is appreciated. There is no  evidence of abdominal wall hernia.     In the pelvis, large and small bowel loops are normal in caliber and  appearance. The terminal ileum and cecum appear normal. The appendix is  identified with fairly good degree of confidence, images 61-55 and  appears normal. The bladder is moderately distended and normal in  appearance. The uterus and ovaries are not enlarged. No intrapelvic free  fluid or inflammatory.       Impression:      1. Focal inflammatory collection in the deep subcutaneous tissues of the  abdominal midline, above the umbilicus, suspicious for small abscess.  Diffuse subcutaneous fat stranding, which may represent an overlying  cellulitis.     2. No evidence of intra-abdominal inflammatory focus or other  intra-abdominal or intrapelvic disease elsewhere.     D:  01/11/2022  E:  01/11/2022     This report was finalized on 1/11/2022 9:22 PM by Dr. Boyd  MD Zachary.       XR Chest 1 View [[id number]] Collected: 228     Updated: 22    Narrative:      EXAMINATION: XR CHEST 1 VW-2022:     INDICATION: Acute febrile illness; R50.9-Fever, unspecified;  D72.825-Bandemia; Z98.890-Other specified postprocedural states;  R43.0-Anosmia.     COMPARISON: NONE.     FINDINGS:  The heart, mediastinum and pulmonary vasculature appear  within normal limits. The lungs appear normally expanded and initially  grossly clear. Slight haziness of the lateral soft tissues of the right  and left chest appears to be due to overlying breast tissue shadow.  Groundglass changes of Covid-19 pneumonia would have a similar  appearance and distribution however.       Impression:      Hazy interstitial opacity at the lateral right and left mid  and lower lungs. This may simply represent overlying breast tissue  shadow. With history of dyspnea and fever, however, consider groundglass  changes associated with Covid-19 pneumonia.     D:  2022  E:  2022            This report was finalized on 2022 8:59 PM by Dr. Oliver Sharma MD.               Impression:   1.  Group A streptococcal abdominal wall cellulitis/abscess-she will need to undergo ultrasound-guided drainage of the abscess.  I will switch her antibiotic regimen to intravenous ceftriaxone and clindamycin.  She may require surgical debridement.  2.  Sepsis-with hyperpyrexia, leukocytosis/neutrophilia, tachycardia, an elevated pro-calcitonin, and a known focus of infection.  3.  Leukocytosis/neutrophilia  4.  Type 2 diabetes mellitus  5.  Status post abdominoplasty  6.  ?Covid 19 infection-I think that her chest x-ray was over read and there is no evidence of Covid 19 infection.      PLAN/RECOMMENDATIONS:   Thank you for asking us to see Suze Vaca, I recommend the followin.  Discontinue Vancomycin   2.  Rocephin 2 gm IV daily  3.  Clindamycin 900 mg  IV qhrs  4.  Discontinue Zosyn   5.  CT  guided abscess drainage  6.  Blood cultures- pending   7.  Wound culture- pending   8.   Discontinue airborne precautions    Dr. Kay has obtained the history, performed the physical exam and formulated the above treatment plan.       I discussed her complex situation with Dr. Buckner and Dr. Beltrán.       John Kay MD  1/12/2022  18:18 EST

## 2022-01-12 NOTE — H&P
Plastic Surgery Consult      Admission Date:  2022  LOS:  0  Patient Care Team:  Daniel Buckner MD as PCP - General (Plastic Surgery)  Daniel Buckner MD as Consulting Physician (Plastic Surgery)    Patient Name:  Suze Vaca  :  1965  MRN:  [id number]    Date:  2022        History of Present Illness:     Suze Vaca is a 55yoF  hx DM2, HTN, HLD, GERD, s/p 21 abdominoplasty with acute onset of fevers, chills, nausea, vomiting and anterior trunk rash. Patient states she had an acute onset of redness on her abdomen on 22 which quickly spread to 1/10/22. Patient contacted and was scheduled to be seen the next day in clinic. Patient states progressive worsening of symptoms. On presentation to clinic patient had subjective fevers, chills, reported nausea and vomiting. Patient states no pain but acute redness which has now spread to her right breast. Patient states some clear yellow drainage from one open area of her incision that did not smell and was minimal. Patient stated fatigue. Patient was quickly sent to [facility] ED for evaluation and lab work.  She denies any new medications and the only change in her medications. No blistering. She did not have a fever until today. She denies any respiratory symptoms and is vaccinated against COVID-19. Her CXR does show GGO concerning for COVID. Patient given Vanc/Zosyn in ED and states she is feeling better. Patient at [facility] ED had recorded fevers of 102 and 103. WBC elevated to 20. CT scan showed 1. Focal inflammatory collection in the deep subcutaneous tissues of the abdominal midline, above the umbilicus, suspicious for small abscess. Diffuse subcutaneous fat stranding, which may represent an overlying cellulitis. Patient was admitted to the hospitalist service.    PMH: HTN, GERD, DM   PSH: Breast reduction 11/15/18  SH: Denies smoking, drinking, no illicit drug abuse. Lives in [place]. Works for the [employer] in [employer].  FH:  None  Allergies: NKDA  Medications: No blood thinner or steroids,    History:   Past Medical History:   Diagnosis Date   • Diabetes mellitus (HCC)    • GERD (gastroesophageal reflux disease)    • Hyperlipidemia    • Hypertension    • Wears glasses      Past Surgical History:   Procedure Laterality Date   • ABDOMINOPLASTY N/A 12/13/2021    Procedure: ABDOMINOPLASTY WITH LIPOSUCTION;  Surgeon: Daniel Buckner MD;  Location: Replaced by [facility];  Service: Plastics;  Laterality: N/A;   • REDUCTION MAMMAPLASTY Bilateral    • VAGINAL DELIVERY       History reviewed. No pertinent family history.  Social History     Socioeconomic History   • Marital status:    Tobacco Use   • Smoking status: Never Smoker   • Smokeless tobacco: Never Used   Vaping Use   • Vaping Use: Never used   Substance and Sexual Activity   • Alcohol use: Yes     Alcohol/week: 1.0 standard drink     Types: 1 Glasses of wine per week     Comment: SOCAILLY   • Drug use: Never     No Known Allergies    Medication:    Current Facility-Administered Medications:   •  [START ON 1/13/2022] !Vancomycin level ordered 1/13 w/ AM labs. Please hold 1/13 1200 dose until pharmacy evaluates, , Does not apply, Once, Zachery Clifford, [facility]  •  acetaminophen (TYLENOL) tablet 650 mg, 650 mg, Oral, Q4H PRN **OR** acetaminophen (TYLENOL) 160 MG/5ML solution 650 mg, 650 mg, Oral, Q4H PRN **OR** acetaminophen (TYLENOL) suppository 650 mg, 650 mg, Rectal, Q4H PRN, Zaida White, DO  •  dextrose (D50W) (25 g/50 mL) IV injection 25 g, 25 g, Intravenous, Q15 Min PRN, Zaida White, DO  •  dextrose (GLUTOSE) oral gel 15 g, 15 g, Oral, Q15 Min PRN, Zaida White, DO  •  enoxaparin (LOVENOX) syringe 40 mg, 40 mg, Subcutaneous, Q24H, Zaida White, DO  •  famotidine (PEPCID) tablet 10 mg, 10 mg, Oral, BID, Zaida White, DO  •  glucagon (human recombinant) (GLUCAGEN DIAGNOSTIC) injection 1 mg, 1 mg, Subcutaneous, Q15 Min PRN, Zaida White, DO  •  insulin lispro  (humaLOG) injection 0-7 Units, 0-7 Units, Subcutaneous, TID AC, Zaida White, DO  •  Magnesium Sulfate 2 gram Bolus, followed by 8 gram infusion (total Mg dose 10 grams)- Mg less than or equal to 1mg/dL, 2 g, Intravenous, PRN **OR** Magnesium Sulfate 2 gram / 50mL Infusion (GIVE X 3 BAGS TO EQUAL 6GM TOTAL DOSE) - Mg 1.1 - 1.5 mg/dl, 2 g, Intravenous, PRN **OR** Magnesium Sulfate 4 gram infusion- Mg 1.6-1.9 mg/dL, 4 g, Intravenous, PRN, Zaida White, DO  •  morphine injection 1 mg, 1 mg, Intravenous, Q4H PRN **AND** naloxone (NARCAN) injection 0.4 mg, 0.4 mg, Intravenous, Q5 Min PRN, Zaida White, DO  •  multivitamin with minerals 2 tablet, 2 tablet, Oral, Daily, Zaida White, DO  •  ondansetron (ZOFRAN) tablet 4 mg, 4 mg, Oral, Q6H PRN **OR** ondansetron (ZOFRAN) injection 4 mg, 4 mg, Intravenous, Q6H PRN, Zaida White, DO  •  Pharmacy to dose vancomycin, , Does not apply, Continuous PRN, Zaida White, DO  •  piperacillin-tazobactam (ZOSYN) 3.375 g in iso-osmotic dextrose 50 ml (premix), 3.375 g, Intravenous, Q8H, Zaida White, DO  •  potassium chloride (MICRO-K) CR capsule 40 mEq, 40 mEq, Oral, PRN, 40 mEq at 01/11/22 1621 **OR** potassium chloride (KLOR-CON) packet 40 mEq, 40 mEq, Oral, PRN **OR** potassium chloride 10 mEq in 100 mL IVPB, 10 mEq, Intravenous, Q1H PRN, Zaida White, DO  •  rosuvastatin (CRESTOR) tablet 20 mg, 20 mg, Oral, Daily, Zaida White, , 20 mg at 01/11/22 1705  •  sodium chloride 0.9 % flush 10 mL, 10 mL, Intravenous, Q12H, Zaida White, DO  •  sodium chloride 0.9 % flush 10 mL, 10 mL, Intravenous, PRN, Zaida White DO  •  sodium chloride 0.9 % infusion, 100 mL/hr, Intravenous, Continuous, Zaida White DO, Last Rate: 100 mL/hr at 01/11/22 1705, 100 mL/hr at 01/11/22 1705  •  [START ON 1/12/2022] vancomycin (VANCOCIN) in iso-osmotic dextrose IVPB 1 g (premix) 200 mL, 1,000 mg, Intravenous, Q12H, Zachery Clifford,  [facility]    Antibiotics:  Anti-Infectives (From admission, onward)    Ordered     Dose/Rate Route Frequency Start Stop    01/11/22 1705  vancomycin (VANCOCIN) in iso-osmotic dextrose IVPB 1 g (premix) 200 mL        Ordering Provider: Zachery Clifford RPH    1,000 mg  over 60 Minutes Intravenous Every 12 Hours 01/12/22 0000 01/15/22 2359    01/11/22 1628  piperacillin-tazobactam (ZOSYN) 3.375 g in iso-osmotic dextrose 50 ml (premix)        Ordering Provider: Zaida White DO    3.375 g  over 4 Hours Intravenous Every 8 Hours 01/11/22 2000 01/16/22 1959 01/11/22 1628  Pharmacy to dose vancomycin        Ordering Provider: Zaida White DO     Does not apply Continuous PRN 01/11/22 1628 01/16/22 1627    01/11/22 1332  piperacillin-tazobactam (ZOSYN) 3.375 g in iso-osmotic dextrose 50 ml (premix)        Ordering Provider: Naveen Ulloa PA    3.375 g  over 30 Minutes Intravenous Once 01/11/22 1334 01/11/22 1420    01/11/22 1332  vancomycin 1250 mg/250 mL 0.9% NS IVPB ([facility])        Ordering Provider: Naveen Ulloa PA    20 mg/kg × 68 kg Intravenous Once 01/11/22 1334 01/11/22 1420          No Known Allergies       Review of Systems:  Constitutional-- No Fever, chills or sweats.  Appetite okay, and no malaise. No fatigue.  HEENT-- No new vision, hearing or throat complaints.  No epistaxis or oral sores.  Denies odynophagia or dysphagia. No headache, photophobia or neck stiffness.  CV-- No chest pain, palpitation or syncope  Resp-- No SOB/cough/Hemoptysis  GI- No nausea, vomiting, or diarrhea.  No hematochezia, melena, or hematemesis. Denies jaundice or chronic liver disease.  -- No dysuria, hematuria, or flank pain.  Denies hesitancy, urgency or flank pain.  Lymph- no swollen lymph nodes in neck/axilla or groin.   Heme- No active bruising or bleeding; no Hx of DVT or PE.  MS-- no swelling or pain in the bones or joints of arms/legs.  No new back pain.  He does not verbalize pain per sister at  bedside.  Neuro-- No acute focal weakness or numbness in the arms or legs.  No seizures.  Skin--No rashes.       Physical Exam:   Vital Signs:  Temp (24hrs), Av.7 °F (39.3 °C), Min:102.2 °F (39 °C), Max:103 °F (39.4 °C)    Temp  Min: 102.2 °F (39 °C)  Max: 103 °F (39.4 °C)  BP  Min: 118/68  Max: 136/58  Pulse  Min: 101  Max: 112  Resp  Min: 14  Max: 17  SpO2  Min: 96 %  Max: 99 %    GENERAL: Awake and alert, in no acute distress.   HEENT: Normocephalic, atraumatic.  EOMI. No conjunctival injection. No icterus.   HEART: RRR;   LUNGS:  Normal respiratory effort. Nonlabored. No dullness.    ABDOMEN: Soft, nontender, nondistended. No rebound or guarding. NO mass or HSM. Improved skin color erythema/rash appears to be dissipating  EXT:  No cyanosis, clubbing or edema. No cord.      Laboratory Data:  Results from last 7 days   Lab Units 22  1222   WBC 10*3/mm3 20.32*   HEMOGLOBIN g/dL 13.4   HEMATOCRIT % 39.8   PLATELETS 10*3/mm3 296     Results from last 7 days   Lab Units 22  1222   SODIUM mmol/L 132*   POTASSIUM mmol/L 3.0*   CHLORIDE mmol/L 93*   CO2 mmol/L 24.0   BUN mg/dL 13   CREATININE mg/dL 0.64   GLUCOSE mg/dL 138*   CALCIUM mg/dL 9.8     Results from last 7 days   Lab Units 22  1222   ALK PHOS U/L 80   BILIRUBIN mg/dL 0.4   ALT (SGPT) U/L 12   AST (SGOT) U/L 16             Results from last 7 days   Lab Units 22  1222   LACTATE mmol/L 1.8             Estimated Creatinine Clearance: 91.9 mL/min (by C-G formula based on SCr of 0.64 mg/dL).    Microbiology:  No results found for: ACANTHNAEG, AFBCX, BPERTUSSISCX, BLOODCX  No results found for: BCIDPCR, CXREFLEX, CSFCX, CULTURETIS  No results found for: CULTURES, HSVCX, URCX  No results found for: EYECULTURE, GCCX, HSVCULTURE, LABHSV  No results found for: LEGIONELLA, MRSACX, MUMPSCX, MYCOPLASCX  No results found for: NOCARDIACX, STOOLCX  No results found for: THROATCX, UNSTIMCULT, URINECX, CULTURE, VZVCULTUR  No results found for:  VIRALCULTU, WOUNDCX          Assessment: 55yoF  hx DM2, HTN, HLD, GERD, s/p 21 abdominoplasty with acute onset of fevers, chills, nausea, vomiting and anterior trunk rash    Plan:  - based on PE and hx concerned for underlying abscess causing a cellulitis but unusual spread and pattern of distribution on to the breasts and extending to the back concerning for something else underlying. Patient appears to be responding to abx and stable. I reviewed imaging with radiology and contacted interventional radiology who both believe it can be aspirated, cultured and drain placement which would be best based on location. Will hold off on surgery unless patient worsens or become unstable.   - continue Abx  - recommend ID consult  - IV fluids  - repeat AM labs  - Discussed case with Dr. Derrick Pete who plans on drainage tomorrow, keep NPO for the procedure  - will continue to follow           Daniel Buckner MD  22  20:18 EST

## 2022-01-12 NOTE — PLAN OF CARE
Goal Outcome Evaluation:  Plan of Care Reviewed With: patient      Abdominal rash improvement noted. Incision of lower abdomen dry and scabbed; no drainage noted this shift.  Patient rested for greater part of the shift.  IV antibiotics administered per MAR. Repeat covid swab sent to lab; airborne precautions continued  Progress: improving

## 2022-01-12 NOTE — PRE-SEDATION DOCUMENTATION
[facility]   Vascular Interventional Radiology  History & Physicial    Patient Name:Suze Vaca    : 1965  MRN: [id number]    Primary Care Physician: Daniel Buckner MD    Referring Physician: No ref. provider found     Date of admission: 2022    Subjective     Reason for Consult: Abd wall abscess pad placement.    History of Present Illness   Suze Vaca is a 56 y.o. female referred to IR as noted above.      Active Hospital Problems:  Active Hospital Problems    Diagnosis    • Acute febrile illness    • Sepsis, unspecified organism (HCC)    • Abscess    • Cellulitis of abdominal wall    • S/P abdominoplasty    • Hypokalemia    • Abnormal CXR        Personal History     Past Medical History:   Diagnosis Date   • Diabetes mellitus (HCC)    • GERD (gastroesophageal reflux disease)    • Hyperlipidemia    • Hypertension    • Wears glasses        Past Surgical History:   Procedure Laterality Date   • ABDOMINOPLASTY N/A 2021    Procedure: ABDOMINOPLASTY WITH LIPOSUCTION;  Surgeon: Daniel Buckner MD;  Location: [facility];  Service: Plastics;  Laterality: N/A;   • REDUCTION MAMMAPLASTY Bilateral    • VAGINAL DELIVERY         Family History: Her family history is not on file.     Social History: She  reports that she has never smoked. She has never used smokeless tobacco. She reports current alcohol use of about 1.0 standard drink of alcohol per week. She reports that she does not use drugs.    Home Medications:  Valsartan, amLODIPine, empagliflozin, famotidine, hydroCHLOROthiazide, metFORMIN, multivitamin with minerals, and rosuvastatin    Current Medications:  •  acetaminophen **OR** acetaminophen **OR** acetaminophen  •  DAPTOmycin  •  dextrose  •  dextrose  •  enoxaparin  •  famotidine  •  glucagon (human recombinant)  •  insulin lispro  •  magnesium sulfate **OR** magnesium sulfate **OR** magnesium sulfate  •  Morphine **AND** naloxone  •  multivitamin  •  ondansetron **OR**  "ondansetron  •  piperacillin-tazobactam  •  potassium chloride **OR** potassium chloride **OR** potassium chloride  •  rosuvastatin  •  sodium chloride  •  sodium chloride     Allergies:  She has No Known Allergies.    Review of Systems    Objective     Visit Vitals  /60 (BP Location: Left arm, Patient Position: Lying)   Pulse 83   Temp 99.1 °F (37.3 °C) (Oral)   Resp 16   Ht 162.6 cm (64\")   Wt 68 kg (150 lb)   SpO2 97%   BMI 25.75 kg/m²        Physical Exam    A&Ox3. Able to communicate  No Apparent Distress  Average physique    Result Review      I have personally reviewed the results from the time of this admission to 1/12/2022 12:29 EST and agree with these findings.  [x]  Laboratory  []  Microbiology  [x]  Radiology  []  EKG/Telemetry   []  Cardiology/Vascular   []  Pathology  []  Old records  []  Other:    Most notable findings include: As noted:    Results from last 7 days   Lab Units 01/12/22  0926 01/12/22  0445 01/11/22  1222   INR  1.18*  --   --    HEMOGLOBIN g/dL  --  13.3 13.4   HEMATOCRIT %  --  39.6 39.8   PLATELETS 10*3/mm3  --  262 296       Estimated Creatinine Clearance: 74.4 mL/min (by C-G formula based on SCr of 0.79 mg/dL).   Creatinine   Date Value Ref Range Status   01/12/2022 0.79 0.57 - 1.00 mg/dL Final   01/11/2022 0.64 0.57 - 1.00 mg/dL Final       Assessment / Plan     Suze Vaca is a 56 y.o. female referred to the IR service with above problem.    Plan:   As above.    Derrick Pete MD   Vascular Interventional Radiology  01/12/22   12:29 PM EST     "

## 2022-01-13 LAB
ANION GAP SERPL CALCULATED.3IONS-SCNC: 13 MMOL/L (ref 5–15)
BACTERIA BLD CULT: NORMAL
BACTERIA SPEC AEROBE CULT: ABNORMAL
BASOPHILS # BLD AUTO: 0.04 10*3/MM3 (ref 0–0.2)
BASOPHILS NFR BLD AUTO: 0.5 % (ref 0–1.5)
BUN SERPL-MCNC: 12 MG/DL (ref 6–20)
BUN/CREAT SERPL: 21.4 (ref 7–25)
CALCIUM SPEC-SCNC: 8.7 MG/DL (ref 8.6–10.5)
CHLORIDE SERPL-SCNC: 101 MMOL/L (ref 98–107)
CO2 SERPL-SCNC: 21 MMOL/L (ref 22–29)
CREAT SERPL-MCNC: 0.56 MG/DL (ref 0.57–1)
DEPRECATED RDW RBC AUTO: 44.3 FL (ref 37–54)
EOSINOPHIL # BLD AUTO: 0.11 10*3/MM3 (ref 0–0.4)
EOSINOPHIL NFR BLD AUTO: 1.3 % (ref 0.3–6.2)
ERYTHROCYTE [DISTWIDTH] IN BLOOD BY AUTOMATED COUNT: 12.8 % (ref 12.3–15.4)
GFR SERPL CREATININE-BSD FRML MDRD: 112 ML/MIN/1.73
GLUCOSE BLDC GLUCOMTR-MCNC: 100 MG/DL (ref 70–130)
GLUCOSE BLDC GLUCOMTR-MCNC: 121 MG/DL (ref 70–130)
GLUCOSE BLDC GLUCOMTR-MCNC: 153 MG/DL (ref 70–130)
GLUCOSE BLDC GLUCOMTR-MCNC: 92 MG/DL (ref 70–130)
GLUCOSE SERPL-MCNC: 103 MG/DL (ref 65–99)
GRAM STN SPEC: ABNORMAL
GRAM STN SPEC: ABNORMAL
HCT VFR BLD AUTO: 37.1 % (ref 34–46.6)
HGB BLD-MCNC: 12.5 G/DL (ref 12–15.9)
IMM GRANULOCYTES # BLD AUTO: 0.02 10*3/MM3 (ref 0–0.05)
IMM GRANULOCYTES NFR BLD AUTO: 0.2 % (ref 0–0.5)
LYMPHOCYTES # BLD AUTO: 1.11 10*3/MM3 (ref 0.7–3.1)
LYMPHOCYTES NFR BLD AUTO: 13.3 % (ref 19.6–45.3)
MCH RBC QN AUTO: 31.6 PG (ref 26.6–33)
MCHC RBC AUTO-ENTMCNC: 33.7 G/DL (ref 31.5–35.7)
MCV RBC AUTO: 93.9 FL (ref 79–97)
MONOCYTES # BLD AUTO: 1.1 10*3/MM3 (ref 0.1–0.9)
MONOCYTES NFR BLD AUTO: 13.2 % (ref 5–12)
NEUTROPHILS NFR BLD AUTO: 5.96 10*3/MM3 (ref 1.7–7)
NEUTROPHILS NFR BLD AUTO: 71.5 % (ref 42.7–76)
NRBC BLD AUTO-RTO: 0 /100 WBC (ref 0–0.2)
PLATELET # BLD AUTO: 245 10*3/MM3 (ref 140–450)
PMV BLD AUTO: 9.3 FL (ref 6–12)
POTASSIUM SERPL-SCNC: 3.5 MMOL/L (ref 3.5–5.2)
RBC # BLD AUTO: 3.95 10*6/MM3 (ref 3.77–5.28)
SODIUM SERPL-SCNC: 135 MMOL/L (ref 136–145)
WBC NRBC COR # BLD: 8.34 10*3/MM3 (ref 3.4–10.8)

## 2022-01-13 PROCEDURE — 85025 COMPLETE CBC W/AUTO DIFF WBC: CPT | Performed by: FAMILY MEDICINE

## 2022-01-13 PROCEDURE — 25010000002 ENOXAPARIN PER 10 MG: Performed by: FAMILY MEDICINE

## 2022-01-13 PROCEDURE — 99232 SBSQ HOSP IP/OBS MODERATE 35: CPT | Performed by: FAMILY MEDICINE

## 2022-01-13 PROCEDURE — 25010000002 CEFTRIAXONE PER 250 MG: Performed by: INTERNAL MEDICINE

## 2022-01-13 PROCEDURE — 80048 BASIC METABOLIC PNL TOTAL CA: CPT | Performed by: FAMILY MEDICINE

## 2022-01-13 PROCEDURE — 82962 GLUCOSE BLOOD TEST: CPT

## 2022-01-13 RX ORDER — CHOLECALCIFEROL (VITAMIN D3) 125 MCG
5 CAPSULE ORAL NIGHTLY
Status: DISCONTINUED | OUTPATIENT
Start: 2022-01-13 | End: 2022-01-14 | Stop reason: HOSPADM

## 2022-01-13 RX ADMIN — DAKIN'S SOLUTION 0.125% (QUARTER STRENGTH): 0.12 SOLUTION at 21:28

## 2022-01-13 RX ADMIN — SODIUM CHLORIDE 2 G: 900 INJECTION INTRAVENOUS at 21:27

## 2022-01-13 RX ADMIN — POTASSIUM CHLORIDE 40 MEQ: 750 CAPSULE, EXTENDED RELEASE ORAL at 08:17

## 2022-01-13 RX ADMIN — MULTIVITAMIN TABLET 1 TABLET: TABLET at 08:17

## 2022-01-13 RX ADMIN — FAMOTIDINE 10 MG: 20 TABLET, FILM COATED ORAL at 21:27

## 2022-01-13 RX ADMIN — Medication 5 MG: at 21:27

## 2022-01-13 RX ADMIN — CLINDAMYCIN PHOSPHATE 900 MG: 900 INJECTION, SOLUTION INTRAVENOUS at 19:56

## 2022-01-13 RX ADMIN — CLINDAMYCIN PHOSPHATE 900 MG: 900 INJECTION, SOLUTION INTRAVENOUS at 03:42

## 2022-01-13 RX ADMIN — POTASSIUM CHLORIDE 40 MEQ: 750 CAPSULE, EXTENDED RELEASE ORAL at 12:22

## 2022-01-13 RX ADMIN — SODIUM CHLORIDE, PRESERVATIVE FREE 10 ML: 5 INJECTION INTRAVENOUS at 08:17

## 2022-01-13 RX ADMIN — ROSUVASTATIN CALCIUM 20 MG: 20 TABLET, FILM COATED ORAL at 08:17

## 2022-01-13 RX ADMIN — ENOXAPARIN SODIUM 40 MG: 40 INJECTION SUBCUTANEOUS at 17:17

## 2022-01-13 RX ADMIN — FAMOTIDINE 10 MG: 20 TABLET, FILM COATED ORAL at 08:17

## 2022-01-13 RX ADMIN — CLINDAMYCIN PHOSPHATE 900 MG: 900 INJECTION, SOLUTION INTRAVENOUS at 12:11

## 2022-01-13 RX ADMIN — SODIUM CHLORIDE, PRESERVATIVE FREE 10 ML: 5 INJECTION INTRAVENOUS at 21:30

## 2022-01-13 RX ADMIN — DAKIN'S SOLUTION 0.125% (QUARTER STRENGTH): 0.12 SOLUTION at 08:18

## 2022-01-13 RX ADMIN — ACETAMINOPHEN 650 MG: 325 TABLET, FILM COATED ORAL at 21:27

## 2022-01-13 NOTE — PROGRESS NOTES
Plastic Surgery Progress Note      Admission Date:  2022  LOS:  1  Patient Care Team:  Daniel Buckner MD as PCP - General (Plastic Surgery)  Daniel Buckner MD as Consulting Physician (Plastic Surgery)    Patient Name:  Suze Vaca  :  1965  MRN:  [id number]    Date:  2022      Subjective:    VSS afebrile. Patient feeling better then afternoon, patient hand her abdominal seroma drained and a drain placed.    History:   Past Medical History:   Diagnosis Date   • Diabetes mellitus (HCC)    • GERD (gastroesophageal reflux disease)    • Hyperlipidemia    • Hypertension    • Wears glasses      Past Surgical History:   Procedure Laterality Date   • ABDOMINOPLASTY N/A 2021    Procedure: ABDOMINOPLASTY WITH LIPOSUCTION;  Surgeon: Daniel Buckner MD;  Location: [facility];  Service: Plastics;  Laterality: N/A;   • REDUCTION MAMMAPLASTY Bilateral    • VAGINAL DELIVERY       History reviewed. No pertinent family history.  Social History     Socioeconomic History   • Marital status:    Tobacco Use   • Smoking status: Never Smoker   • Smokeless tobacco: Never Used   Vaping Use   • Vaping Use: Never used   Substance and Sexual Activity   • Alcohol use: Yes     Alcohol/week: 1.0 standard drink     Types: 1 Glasses of wine per week     Comment: SOCAILLY   • Drug use: Never     No Known Allergies    Medication:    Current Facility-Administered Medications:   •  acetaminophen (TYLENOL) tablet 650 mg, 650 mg, Oral, Q4H PRN, 650 mg at 22 1739 **OR** acetaminophen (TYLENOL) 160 MG/5ML solution 650 mg, 650 mg, Oral, Q4H PRN **OR** acetaminophen (TYLENOL) suppository 650 mg, 650 mg, Rectal, Q4H PRN, Zaida White DO  •  [START ON 2022] cefTRIAXone (ROCEPHIN) 2 g/100 mL 0.9% NS IVPB (MBP), 2 g, Intravenous, Q24H, John Kay MD  •  clindamycin (CLEOCIN) 900 mg in dextrose 5% 50 mL IVPB (premix), 900 mg, Intravenous, Q8H, John Kay MD  •  dextrose (D50W) (25 g/50 mL)  IV injection 25 g, 25 g, Intravenous, Q15 Min PRN, Zaida White, DO  •  dextrose (GLUTOSE) oral gel 15 g, 15 g, Oral, Q15 Min PRN, Zaida White, DO  •  diphenhydrAMINE (BENADRYL) capsule 25 mg, 25 mg, Oral, Q6H PRN, Daniel Buckner MD  •  enoxaparin (LOVENOX) syringe 40 mg, 40 mg, Subcutaneous, Q24H, Zaida White, , 40 mg at 01/12/22 1820  •  famotidine (PEPCID) tablet 10 mg, 10 mg, Oral, BID, Zaida White, , 10 mg at 01/12/22 0910  •  glucagon (human recombinant) (GLUCAGEN DIAGNOSTIC) injection 1 mg, 1 mg, Subcutaneous, Q15 Min PRN, Zaida White, DO  •  insulin lispro (humaLOG) injection 0-7 Units, 0-7 Units, Subcutaneous, TID AC, Zaida White,   •  Magnesium Sulfate 2 gram Bolus, followed by 8 gram infusion (total Mg dose 10 grams)- Mg less than or equal to 1mg/dL, 2 g, Intravenous, PRN **OR** Magnesium Sulfate 2 gram / 50mL Infusion (GIVE X 3 BAGS TO EQUAL 6GM TOTAL DOSE) - Mg 1.1 - 1.5 mg/dl, 2 g, Intravenous, PRN **OR** Magnesium Sulfate 4 gram infusion- Mg 1.6-1.9 mg/dL, 4 g, Intravenous, PRN, Zaida White, DO  •  morphine injection 1 mg, 1 mg, Intravenous, Q4H PRN **AND** naloxone (NARCAN) injection 0.4 mg, 0.4 mg, Intravenous, Q5 Min PRN, Zaida White, DO  •  multivitamin (THERAGRAN) tablet 1 tablet, 1 tablet, Oral, Daily, Bhavana Ross, PharmD  •  ondansetron (ZOFRAN) tablet 4 mg, 4 mg, Oral, Q6H PRN **OR** ondansetron (ZOFRAN) injection 4 mg, 4 mg, Intravenous, Q6H PRN, Zaida White, DO  •  potassium chloride (MICRO-K) CR capsule 40 mEq, 40 mEq, Oral, PRN, 40 mEq at 01/12/22 0019 **OR** potassium chloride (KLOR-CON) packet 40 mEq, 40 mEq, Oral, PRN **OR** potassium chloride 10 mEq in 100 mL IVPB, 10 mEq, Intravenous, Q1H PRN, Zaida White, DO  •  rosuvastatin (CRESTOR) tablet 20 mg, 20 mg, Oral, Daily, Zaida White, , 20 mg at 01/12/22 0910  •  sodium chloride 0.9 % flush 10 mL, 10 mL, Intravenous, Q12H, Zaida White, DO, 10  mL at 22 0913  •  sodium chloride 0.9 % flush 10 mL, 10 mL, Intravenous, PRN, Zaida White, DO  •  sodium hypochlorite (DAKIN'S 1/4 STRENGTH) 0.125 % topical solution 0.125% solution, , Topical, BID, Daniel Buckner MD    Antibiotics:  Anti-Infectives (From admission, onward)    Ordered     Dose/Rate Route Frequency Start Stop    22 181  cefTRIAXone (ROCEPHIN) 2 g/100 mL 0.9% NS IVPB (MBP)        Ordering Provider: John Kay MD    2 g  over 30 Minutes Intravenous Every 24 Hours 22 0000 22 2359    22 1816  clindamycin (CLEOCIN) 900 mg in dextrose 5% 50 mL IVPB (premix)        Ordering Provider: John Kay MD    900 mg  50 mL/hr over 60 Minutes Intravenous Every 8 Hours 22 19522 1332  piperacillin-tazobactam (ZOSYN) 3.375 g in iso-osmotic dextrose 50 ml (premix)        Ordering Provider: Naveen Ulloa PA    3.375 g  over 30 Minutes Intravenous Once 22 1334 22 1420    22 1332  vancomycin 1250 mg/250 mL 0.9% NS IVPB ([facility])        Ordering Provider: Naveen Ulloa PA    20 mg/kg × 68 kg Intravenous Once 22 1334 22 1420            Objective:    Physical Exam:   Vital Signs:  Temp (24hrs), Av.8 °F (38.2 °C), Min:99.1 °F (37.3 °C), Max:103.2 °F (39.6 °C)    Temp  Min: 99.1 °F (37.3 °C)  Max: 103.2 °F (39.6 °C)  BP  Min: 103/60  Max: 151/69  Pulse  Min: 83  Max: 97  Resp  Min: 16  Max: 20  SpO2  Min: 94 %  Max: 100 %    GENERAL: Awake and alert, in no acute distress.   HEENT: Normocephalic, atraumatic.  EOMI. No conjunctival injection. No icterus.   HEART: RRR;   LUNGS: Normal respiratory effort. Nonlabored. No dullness.  ABDOMEN: Soft, nontender, nondistended. No rebound or guarding. Erythema improving turning lighter, lateral opening now with purulent drainage with deep palpation.       Laboratory Data:  Results from last 7 days   Lab Units 22  0445 22  1222   WBC 10*3/mm3 15.33*  20.32*   HEMOGLOBIN g/dL 13.3 13.4   HEMATOCRIT % 39.6 39.8   PLATELETS 10*3/mm3 262 296     Results from last 7 days   Lab Units 22  0445   SODIUM mmol/L 135*   POTASSIUM mmol/L 3.9   CHLORIDE mmol/L 101   CO2 mmol/L 24.0   BUN mg/dL 13   CREATININE mg/dL 0.79   GLUCOSE mg/dL 119*   CALCIUM mg/dL 9.4     Results from last 7 days   Lab Units 22  1222   ALK PHOS U/L 80   BILIRUBIN mg/dL 0.4   ALT (SGPT) U/L 12   AST (SGOT) U/L 16             Results from last 7 days   Lab Units 22  1222   LACTATE mmol/L 1.8     Results from last 7 days   Lab Units 22  0445   CK TOTAL U/L 39         Estimated Creatinine Clearance: 74.4 mL/min (by C-G formula based on SCr of 0.79 mg/dL).    Microbiology:  Blood Culture   Date Value Ref Range Status   2022 No growth at 24 hours  Preliminary   2022 No growth at 24 hours  Preliminary     No results found for: BCIDPCR, CXREFLEX, CSFCX, CULTURETIS  No results found for: CULTURES, HSVCX, URCX  No results found for: EYECULTURE, GCCX, HSVCULTURE, LABHSV  No results found for: LEGIONELLA, MRSACX, MUMPSCX, MYCOPLASCX  No results found for: NOCARDIACX, STOOLCX  Urine Culture   Date Value Ref Range Status   2022 <25,000 CFU/mL Mixed Monique Isolated  Final     Wound Culture   Date Value Ref Range Status   2022 Scant growth (1+) Streptococcus pyogenes (Group A) (A)  Preliminary     Comment:     This organism is considered to be universally susceptible to penicillin.  No further antibiotic testing will be performed. If Clindamycin or Erythromycin is the drug of choice, notify the laboratory within 7 days to request susceptibility testing.            Assessment: 55yoF  hx DM2, HTN, HLD, GERD, s/p 21 abdominoplasty with a streptococcus infection s/p 22 IR drainage and drain placement including bedside washout and debridement of incisional drainage site     Plan:  - no acute surgery at this point in time, IR drained fluid collection followed  by drain placement, discussed patient with IR agreed that opening the incisional drainage site might help. Bedside debridement and washout of left abdominal incisional site drainage.   - discussed the risks and benefits at bedside including prolonged wound requiring dressing changes. Patient elected to proceed with the procedure  - ordered dakins dressing changes to Lt abdominal wound TID  - follow cultures  - Abx per ID  - follow up AM labs  - recommend abdominal binder for compression  - will continue to follow        Date: 1/12/22  Physician: Dr. Buckner    Procedure Performed:   1. Debridement and washout of left abdominal incision draining wound  Anesthesia: Local  Estimated blood loss: <3 cc  Specimens:  None  Complications: None immediate     Description of the procedure:   At bedside the patient was then place in a supine position. Risks and benefits were discussed with the patient including risk of hematoma, infection, scarring, poor scarring, an open wound requiring packing. Patient elected to proceed with the procedure. The skin lesion was identified with the patient and marked. The site was cleaned with alcohol swab. Local 1% lidocaine was placed at the sites of lesion excision a total of 10 cc was used. The patient was then prepped and draped in a sterile like fashion.     A 11 blade was then used to open up the incision about 5-6 cm. A hemostat was then used to open the wound and probe superiorly. No further purulence was expressed. The wound was irrigated with 40 cc of normal saline. The incisional wound was then packed with kerlix soaked with dakins solution.     At this point in time the procedure has finished. Patient tolerated the procedure well without immediate complications.    Daniel Buckner MD  01/12/22  19:59 EST

## 2022-01-13 NOTE — PROGRESS NOTES
[facility]  PROGRESS NOTE    Patient Name: Suze Vaca  : 1965  MRN: [id number]    Date of Admission: 2022  Primary Care Physician: Daniel Buckner MD    Subjective   Subjective     CC:  F/U cellulitis, abscess     HPI:  Patient seen and examined. RN notes reviewed. No acute events overnight. Pt states she is feeling better. Cellulitis improved.     ROS:  Gen- +fevers, no chills  CV- No chest pain, palpitations  Resp- No cough, dyspnea  GI- No N/V/D, abd pain    Objective   Objective     Vital Signs:   Temp:  [97.6 °F (36.4 °C)-103.1 °F (39.5 °C)] 98.9 °F (37.2 °C)  Heart Rate:  [69-97] 78  Resp:  [18-20] 20  BP: (106-151)/(54-73) 127/73  Flow (L/min):  [2] 2     Physical Exam:  Constitutional: No acute distress, awake, alert  HENT: NCAT, mucous membranes moist  Respiratory: Clear to auscultation bilaterally, respiratory effort normal   Cardiovascular: RRR, no murmurs, rubs, or gallops  Gastrointestinal: Positive bowel sounds, soft, nontender, nondistended  Musculoskeletal: No bilateral ankle edema  Psychiatric: Appropriate affect, cooperative  Neurologic: Oriented x 3, strength symmetric in all extremities, Cranial Nerves grossly intact to confrontation, speech clear  Skin: abdominal erythema improved, TIARRA drain in place, L incision covered     Results Reviewed:  LAB RESULTS:      Lab 22  0311 22  0926 22  0445 22  1222   WBC 8.34  --  15.33* 20.32*   HEMOGLOBIN 12.5  --  13.3 13.4   HEMATOCRIT 37.1  --  39.6 39.8   PLATELETS 245  --  262 296   NEUTROS ABS 5.96  --  13.72* 18.07*   IMMATURE GRANS (ABS) 0.02  --  0.07* 0.15*   LYMPHS ABS 1.11  --  0.60* 1.18   MONOS ABS 1.10*  --  0.88 0.76   EOS ABS 0.11  --  0.03 0.12   MCV 93.9  --  92.1 93.0   PROCALCITONIN  --   --  0.77* 0.87*   LACTATE  --   --   --  1.8   PROTIME  --  14.6*  --   --          Lab 22  0311 22  0445 22  1222   SODIUM 135* 135* 132*    POTASSIUM 3.5 3.9 3.0*   CHLORIDE 101 101 93*   CO2 21.0* 24.0 24.0   ANION GAP 13.0 10.0 15.0   BUN 12 13 13   CREATININE 0.56* 0.79 0.64   GLUCOSE 103* 119* 138*   CALCIUM 8.7 9.4 9.8   MAGNESIUM  --  2.0 2.0   HEMOGLOBIN A1C  --   --  6.20*         Lab 01/11/22  1222   TOTAL PROTEIN 7.8   ALBUMIN 4.10   GLOBULIN 3.7   ALT (SGPT) 12   AST (SGOT) 16   BILIRUBIN 0.4   ALK PHOS 80         Lab 01/12/22  0926   PROTIME 14.6*   INR 1.18*                 Brief Urine Lab Results  (Last result in the past 365 days)      Color   Clarity   Blood   Leuk Est   Nitrite   Protein   CREAT   Urine HCG        01/11/22 1342 Yellow   Clear   Trace   Negative   Negative   30 mg/dL (1+)                 Microbiology Results Abnormal     Procedure Component Value - Date/Time    Body Fluid Culture - Body Fluid, Peritoneum [[id number]] Collected: 01/12/22 1632    Lab Status: Preliminary result Specimen: Body Fluid from Peritoneum Updated: 01/13/22 1010     Body Fluid Culture No growth     Gram Stain Few (2+) WBCs seen      No organisms seen    Blood Culture ID, PCR - Blood, Hand, Right [[id number]]  (Normal) Collected: 01/11/22 1222    Lab Status: Final result Specimen: Blood from Hand, Right Updated: 01/13/22 0933     BCID, PCR Negative by BCID PCR. Culture to Follow.    Urine Culture - Urine, Urine, Clean Catch [[id number]] Collected: 01/11/22 1342    Lab Status: Final result Specimen: Urine, Clean Catch Updated: 01/12/22 1703     Urine Culture <25,000 CFU/mL Mixed Ferny Isolated    Narrative:      Specimen contains mixed organisms of questionable pathogenicity which indicates contamination with commensal ferny.  Further identification is unlikely to provide clinically useful information.  Suggest recollection.    COVID-19, [facility] IN-HOUSE NP/OP SWAB IN UTM/VTM/SALINE TRANSPORT MEDIA 24HR TAT - Swab, Nasopharynx [[id number]]  (Normal) Collected: 01/12/22 0426    Lab Status: Final result Specimen: Swab from Nasopharynx Updated:  01/12/22 1403     COVID19 Not Detected    Narrative:      Fact sheet for providers: https://www.fda.gov/media/752960/download     Fact sheet for patients: https://www.fda.gov/media/346950/download    Test performed by RT PCR.    Blood Culture - Blood, Arm, Right [[id number]]  (Normal) Collected: 01/11/22 1222    Lab Status: Preliminary result Specimen: Blood from Arm, Right Updated: 01/12/22 1316     Blood Culture No growth at 24 hours    COVID PRE-OP / PRE-PROCEDURE SCREENING ORDER (NO ISOLATION) - Swab, Nasopharynx [[id number]]  (Normal) Collected: 01/11/22 1100    Lab Status: Final result Specimen: Swab from Nasopharynx Updated: 01/11/22 1156    Narrative:      The following orders were created for panel order COVID PRE-OP / PRE-PROCEDURE SCREENING ORDER (NO ISOLATION) - Swab, Nasopharynx.  Procedure                               Abnormality         Status                     ---------                               -----------         ------                     COVID-19 and FLU A/B PCR...[[id number]]  Normal              Final result                 Please view results for these tests on the individual orders.    COVID-19 and FLU A/B PCR - Swab, Nasopharynx [[id number]]  (Normal) Collected: 01/11/22 1100    Lab Status: Final result Specimen: Swab from Nasopharynx Updated: 01/11/22 1156     COVID19 Not Detected     Influenza A PCR Not Detected     Influenza B PCR Not Detected    Narrative:      Fact sheet for providers: https://www.fda.gov/media/370815/download    Fact sheet for patients: https://www.fda.gov/media/587370/download    Test performed by PCR.          CT Abdomen Pelvis Without Contrast    Result Date: 1/11/2022  EXAMINATION: CT ABDOMEN AND PELVIS WO CONTRAST-01/11/2022:  INDICATION: Leukocytosis; R50.9-Fever, unspecified; D72.825-Bandemia; Z98.890-Other specified postprocedural states; R43.0-Anosmia.  TECHNIQUE: 5 mm unenhanced images through the abdomen and pelvis.  The radiation dose reduction device  was turned on for each scan per the ALARA (As Low as Reasonably Achievable) protocol.  COMPARISON: NONE.  FINDINGS: The patient indicates leukocytosis, fever, the patient's chart indicates previous abdominoplasty and liposuction 12/13/2021.  There is diffuse subcutaneous fat stranding of the abdomen, from a level just above the umbilicus, inferiorly to several centimeters above the pubic symphysis. There is some low-density change, presumably small focal dense edema in the deep subcutaneous fat anterior to the premuscular fascia over a roughly 6 x 12 mm area, somewhat more cephalad, however, in the midline above the umbilicus, this appears to continue as a more well-defined rounded 2.5 cm collection with a single air bubble, axial images 34-37, potentially a small subcutaneous abscess. No potential inflammatory focus is identified elsewhere.  The remainder of the scan shows the included lower lungs to appear clear. There is diffuse fatty liver change. Gallbladder, spleen, pancreas, adrenal glands, and kidneys appear unremarkable for a non-IV contrast enhanced exam. No upper abdominal free air, ascites, adenopathy, or acute inflammatory focus is appreciated. There is no evidence of abdominal wall hernia.  In the pelvis, large and small bowel loops are normal in caliber and appearance. The terminal ileum and cecum appear normal. The appendix is identified with fairly good degree of confidence, images 61-55 and appears normal. The bladder is moderately distended and normal in appearance. The uterus and ovaries are not enlarged. No intrapelvic free fluid or inflammatory.      Impression: 1. Focal inflammatory collection in the deep subcutaneous tissues of the abdominal midline, above the umbilicus, suspicious for small abscess. Diffuse subcutaneous fat stranding, which may represent an overlying cellulitis.  2. No evidence of intra-abdominal inflammatory focus or other intra-abdominal or intrapelvic disease elsewhere.   D:  01/11/2022 E:  01/11/2022  This report was finalized on 1/11/2022 9:22 PM by Dr. Oliver Sharma MD.      CT Chest Without Contrast Diagnostic    Result Date: 1/12/2022  EXAMINATION: CT CHEST WO CONTRAST DIAGNOSTIC-  INDICATION: sepsis, concern for GGO on CXR; R50.9-Fever, unspecified; D72.825-Bandemia; Z98.890-Other specified postprocedural states; R43.0-Anosmia  TECHNIQUE: Axial noncontrast CT of the chest with multiplanar reconstruction  The radiation dose reduction device was turned on for each scan per the ALARA (As Low as Reasonably Achievable) protocol.  COMPARISON: NONE  FINDINGS: Nonspecific edema and lymphadenopathy is present and bilateral axillary regions, with largest discrete lymph node measuring 2.0 cm long axis on the right, 18 mm on the left. No acute findings in the partially imaged upper abdomen. No pleural or pericardial effusion. No pathologic mediastinal or hilar lymphadenopathy. Evaluation of the osseous structures demonstrates no evidence of acute fracture or aggressive osseous lesion. Mildly atherosclerotic nonaneurysmal thoracic aorta. Evaluation of the lung fields demonstrates no evidence of acute infectious or inflammatory process. There are no suspicious pulmonary nodules.      Impression: Nonspecific and fairly symmetric bilateral axillary lymphadenopathy. Finding is of uncertain clinical significance.  Otherwise essentially normal noncontrast CT of the chest. Specifically there is no evidence of significant groundglass opacity or other findings to suggest acute infectious or inflammatory process. There is no suspicious pulmonary nodularity.   This report was finalized on 1/12/2022 1:37 PM by Nick Hamilton.      XR Chest 1 View    Result Date: 1/11/2022  EXAMINATION: XR CHEST 1 VW-01/11/2022:  INDICATION: Acute febrile illness; R50.9-Fever, unspecified; D72.825-Bandemia; Z98.890-Other specified postprocedural states; R43.0-Anosmia.  COMPARISON: NONE.  FINDINGS:  The heart, mediastinum  and pulmonary vasculature appear within normal limits. The lungs appear normally expanded and initially grossly clear. Slight haziness of the lateral soft tissues of the right and left chest appears to be due to overlying breast tissue shadow. Groundglass changes of Covid-19 pneumonia would have a similar appearance and distribution however.      Impression: Hazy interstitial opacity at the lateral right and left mid and lower lungs. This may simply represent overlying breast tissue shadow. With history of dyspnea and fever, however, consider groundglass changes associated with Covid-19 pneumonia.  D:  01/11/2022 E:  01/11/2022     This report was finalized on 1/11/2022 8:59 PM by Dr. Oliver Sharma MD.      US Guided Abscess Drain Abdomen/Pelvis    Result Date: 1/12/2022  US Guided aspiration of abdominal wall fluid collection and placement of a drainage catheter.                                                                                                             History: Postop infected seroma in the anterior abdominal wall                                               : Derrick Pete MD.  Assistant:  None.                                                                             Modality: Ultrasound.                                                                                         SEDATION: Moderate sedation was administered. 1 milligram of Versed and 50 micrograms of fentanyl IV was used for moderate sedation monitored under my direction. Total intra service time of sedation was 13 minutes. The patient's vital signs were monitored throughout the procedure and recorded in the patient's medical record by the nurse.  Anesthesia: 1% lidocaine.             Estimated blood loss:  < 5 cc.          Technique: A thorough discussion of the risks, benefits, and alternatives of the procedure, and if applicable, moderate sedation was carried out with the patient or the patient's next of kin. Any  questions were answered. They verbalized understanding. A written informed consent was then signed.   A timeout was performed prior to starting the procedure.  The procedure room personnel used personal protective equipment. The operators used sterile gowns and gloves in addition. The surgical site was prepped with chlorhexidine gluconate and draped in the maximal sterile fashion.  A preliminary ultrasonography was performed to assess the target and determine a safe access site. It showed an anterior abdominal wall fluid collection sandwiched between the fatty and muscular layer with one large epigastric pocket that continues into a craniocaudad rectangular layer of fluid in the left anterior abdominal wall.  Pertinent ultrasound images were secured to the PACS for documentation.  A supraumbilical midline access site was selected and sterilely prepped and draped. Local anesthesia was administered.  Using aseptic precautions, real-time ultrasonographic guidance, an 8.5 Martiniquais locking pigtail catheter was advanced into the target in a single stick trocar-cannula technique followed by performance of aspiration. Pus dark straw-colored fluid was recovered. Approximately 20 cc of fluid was aspirated and sent to the lab for examination.  The catheter was secured to skin with nonabsorbable suture, StatLock and Tegaderm.  After recovery, the patient was discharged from the department in stable condition.  Complications: None immediate.  Specimen: Sent to the lab                                                                         Impression: Impression:               Successful ultrasound-guided aspiration and drainage of a postop seroma in the anterior abdominal wall.  Thank you for the opportunity to assist in the care of your patient.  This report was finalized on 1/12/2022 4:00 PM by Derrick Pete MD.            I have reviewed the medications:  Scheduled Meds:cefTRIAXone, 2 g, Intravenous, Q24H  clindamycin, 900 mg,  Intravenous, Q8H  enoxaparin, 40 mg, Subcutaneous, Q24H  famotidine, 10 mg, Oral, BID  insulin lispro, 0-7 Units, Subcutaneous, TID AC  multivitamin, 1 tablet, Oral, Daily  rosuvastatin, 20 mg, Oral, Daily  sodium chloride, 10 mL, Intravenous, Q12H  sodium hypochlorite, , Topical, BID      Continuous Infusions:   PRN Meds:.•  acetaminophen **OR** acetaminophen **OR** acetaminophen  •  dextrose  •  dextrose  •  diphenhydrAMINE  •  glucagon (human recombinant)  •  magnesium sulfate **OR** magnesium sulfate **OR** magnesium sulfate  •  Morphine **AND** naloxone  •  ondansetron **OR** ondansetron  •  potassium chloride **OR** potassium chloride **OR** potassium chloride  •  sodium chloride    Assessment/Plan   Assessment & Plan     Active Hospital Problems    Diagnosis  POA   • Acute febrile illness [R50.9]  Yes   • Sepsis, unspecified organism (HCC) [A41.9]  Unknown   • Abscess [L02.91]  Unknown   • Cellulitis of abdominal wall [L03.311]  Unknown   • S/P abdominoplasty [Z98.890]  Not Applicable   • Hypokalemia [E87.6]  Unknown   • Abnormal CXR [R93.89]  Unknown      Resolved Hospital Problems   No resolved problems to display.        Brief Hospital Course to date:  Suze Vaca is a 56 y.o. female with PMHx DM2, HTN, HLD, GERD, hx of bilateral breast reduction with excess abdominal skin. Patient had a recent hx of 40 lbs weight loss, felt she was at her ideal weight, and decided to proceed with abdominoplasty to remove excess abdominal skin with Dr Daniel Buckner 12/13/21. Patient did well with procedure, was kept overnight for observation, and discharged home the next day. She was given Ancef prior to surgery and was discharged home with 3 more days of Keflex which she completed.  She presents to ED today at direction of Dr Buckner due to rash. Patient states her rash started Sunday (1/9/22) night. It did not itch at first but is starting to itch now. She denies any new medications and the only change in her  medications has been a decrease in her Jardiance from 25mg to 10mg. No blistering.   Patient has noticed some drainage from her incision.     This patient's problems and plans were partially entered by my partner and updated as appropriate by me 01/13/22.    Sepsis (fever, tachycardia, leukocytosis. Source: cellulitis/abscess)  Abdominal wall cellulitis  Concern for small abscess above umbilicus   Recent Abdominoplasty   -Dr Daniel Buckner, plastic surgery following  -S/P IR US guided aspiration of abdominal wall fluid collection and placement of TIARRA drain 1/12/22  -S/P bedside debridement and washout of left abdominal incision draining wound per Dr Buckner 1/12/22  -Wound Cx on admission + Strep pyogenes   -IR wound cultures pending   -ID following, continue Rocephin and Clindamycin   -Blood Cx x2 1/2 with GPC but BCID negative       Abnormal CXR on admission   -CXR with possible Ground-Glass changes  -CT chest negative   -COVID test negative x 2  -Isolation precautions DC'd   -Per patient, she is fully vaccinated (has not had booster)      HTN  -Holding BP meds in setting of sepsis, resume as able  -BP improved today but still WNL without medication      DM2  Glucosuria, Proteinuria   -Hold Jardiance, Metformin   -LDSSI for now   -Hga1c 6.2     GERD  -Continue Pepcid      HLD  -Continue Crestor      Hypokalemia  -Resolved with replacement      ?UTI  -Cx with <25,000 CFUs mixed ferny  -ABX per above  -Pt did complain of some L flank pain on admission     DVT prophylaxis:  Medical DVT prophylaxis orders are present.       AM-PAC 6 Clicks Score (PT): 24 (01/13/22 0800)    Disposition: I expect the patient to be discharged TBD. Need final cultures and susceptibilities.      CODE STATUS:   Code Status and Medical Interventions:   Ordered at: 01/11/22 1839     Level Of Support Discussed With:    Patient     Code Status (Patient has no pulse and is not breathing):    CPR (Attempt to Resuscitate)     Medical Interventions  (Patient has pulse or is breathing):    Full Support       Zaida White, DO  01/13/22

## 2022-01-13 NOTE — PROGRESS NOTES
Plastic Surgery Progress Note      Admission Date:  2022  LOS:  2  Patient Care Team:  Daniel Buckner MD as PCP - General (Plastic Surgery)  Daniel Buckner MD as Consulting Physician (Plastic Surgery)    Patient Name:  Suze Vaca  :  1965  MRN:  [id number]    Date:  2022        Subjective:    VSS afebrile, patient feels a lot better no N/V/F/C      History:   Past Medical History:   Diagnosis Date   • Diabetes mellitus (HCC)    • GERD (gastroesophageal reflux disease)    • Hyperlipidemia    • Hypertension    • Wears glasses      Past Surgical History:   Procedure Laterality Date   • ABDOMINOPLASTY N/A 2021    Procedure: ABDOMINOPLASTY WITH LIPOSUCTION;  Surgeon: Daniel Buckner MD;  Location: [facility];  Service: Plastics;  Laterality: N/A;   • REDUCTION MAMMAPLASTY Bilateral    • VAGINAL DELIVERY       History reviewed. No pertinent family history.  Social History     Socioeconomic History   • Marital status:    Tobacco Use   • Smoking status: Never Smoker   • Smokeless tobacco: Never Used   Vaping Use   • Vaping Use: Never used   Substance and Sexual Activity   • Alcohol use: Yes     Alcohol/week: 1.0 standard drink     Types: 1 Glasses of wine per week     Comment: SOCAILLY   • Drug use: Never     No Known Allergies    Medication:    Current Facility-Administered Medications:   •  acetaminophen (TYLENOL) tablet 650 mg, 650 mg, Oral, Q4H PRN, 650 mg at 22 1739 **OR** acetaminophen (TYLENOL) 160 MG/5ML solution 650 mg, 650 mg, Oral, Q4H PRN **OR** acetaminophen (TYLENOL) suppository 650 mg, 650 mg, Rectal, Q4H PRN, Zaida White, DO  •  cefTRIAXone (ROCEPHIN) 2 g/100 mL 0.9% NS IVPB (MBP), 2 g, Intravenous, Q24H, John Kay MD, 2 g at 22 2347  •  clindamycin (CLEOCIN) 900 mg in dextrose 5% 50 mL IVPB (premix), 900 mg, Intravenous, Q8H, John Kay MD, Last Rate: 50 mL/hr at 22 1211, 900 mg at 22 1211  •  dextrose (D50W) (25 g/50  mL) IV injection 25 g, 25 g, Intravenous, Q15 Min PRN, Zaida White,   •  dextrose (GLUTOSE) oral gel 15 g, 15 g, Oral, Q15 Min PRN, Zaida White,   •  diphenhydrAMINE (BENADRYL) capsule 25 mg, 25 mg, Oral, Q6H PRN, Daniel Buckner MD  •  enoxaparin (LOVENOX) syringe 40 mg, 40 mg, Subcutaneous, Q24H, Zaida White, , 40 mg at 01/12/22 1820  •  famotidine (PEPCID) tablet 10 mg, 10 mg, Oral, BID, Zaida White, , 10 mg at 01/13/22 0817  •  glucagon (human recombinant) (GLUCAGEN DIAGNOSTIC) injection 1 mg, 1 mg, Subcutaneous, Q15 Min PRN, Zaida White, DO  •  insulin lispro (humaLOG) injection 0-7 Units, 0-7 Units, Subcutaneous, TID AC, Zaida White,   •  Magnesium Sulfate 2 gram Bolus, followed by 8 gram infusion (total Mg dose 10 grams)- Mg less than or equal to 1mg/dL, 2 g, Intravenous, PRN **OR** Magnesium Sulfate 2 gram / 50mL Infusion (GIVE X 3 BAGS TO EQUAL 6GM TOTAL DOSE) - Mg 1.1 - 1.5 mg/dl, 2 g, Intravenous, PRN **OR** Magnesium Sulfate 4 gram infusion- Mg 1.6-1.9 mg/dL, 4 g, Intravenous, PRN, Zaida White DO  •  morphine injection 1 mg, 1 mg, Intravenous, Q4H PRN **AND** naloxone (NARCAN) injection 0.4 mg, 0.4 mg, Intravenous, Q5 Min PRN, Zaida White, DO  •  multivitamin (THERAGRAN) tablet 1 tablet, 1 tablet, Oral, Daily, Bhavana Ross, PharmD, 1 tablet at 01/13/22 0817  •  ondansetron (ZOFRAN) tablet 4 mg, 4 mg, Oral, Q6H PRN **OR** ondansetron (ZOFRAN) injection 4 mg, 4 mg, Intravenous, Q6H PRN, Zaida White,   •  potassium chloride (MICRO-K) CR capsule 40 mEq, 40 mEq, Oral, PRN, 40 mEq at 01/13/22 0817 **OR** potassium chloride (KLOR-CON) packet 40 mEq, 40 mEq, Oral, PRN **OR** potassium chloride 10 mEq in 100 mL IVPB, 10 mEq, Intravenous, Q1H PRN, Zaida White,   •  rosuvastatin (CRESTOR) tablet 20 mg, 20 mg, Oral, Daily, Zaida White DO, 20 mg at 01/13/22 0817  •  sodium chloride 0.9 % flush 10 mL, 10 mL, Intravenous,  Q12H, Zaida White DO, 10 mL at 22 0817  •  sodium chloride 0.9 % flush 10 mL, 10 mL, Intravenous, PRN, Zaida White DO  •  sodium hypochlorite (DAKIN'S 1/4 STRENGTH) 0.125 % topical solution 0.125% solution, , Topical, BID, Daniel Buckner MD, Given at 22 0818    Antibiotics:  Anti-Infectives (From admission, onward)    Ordered     Dose/Rate Route Frequency Start Stop    22 1815  cefTRIAXone (ROCEPHIN) 2 g/100 mL 0.9% NS IVPB (MBP)        Ordering Provider: John Kay MD    2 g  over 30 Minutes Intravenous Every 24 Hours 22 181  clindamycin (CLEOCIN) 900 mg in dextrose 5% 50 mL IVPB (premix)        Ordering Provider: John Kay MD    900 mg  50 mL/hr over 60 Minutes Intravenous Every 8 Hours 22 133  piperacillin-tazobactam (ZOSYN) 3.375 g in iso-osmotic dextrose 50 ml (premix)        Ordering Provider: Naveen Ulloa PA    3.375 g  over 30 Minutes Intravenous Once 22 1334 22 1420    22 1332  vancomycin 1250 mg/250 mL 0.9% NS IVPB ([facility])        Ordering Provider: Naveen Ulloa PA    20 mg/kg × 68 kg Intravenous Once 22 1334 22 1420            Objective:    Physical Exam:   Vital Signs:  Temp (24hrs), Av.7 °F (37.6 °C), Min:97.6 °F (36.4 °C), Max:103.1 °F (39.5 °C)    Temp  Min: 97.6 °F (36.4 °C)  Max: 103.1 °F (39.5 °C)  BP  Min: 106/54  Max: 151/69  Pulse  Min: 69  Max: 97  Resp  Min: 18  Max: 20  SpO2  Min: 90 %  Max: 100 %    GENERAL: Awake and alert, in no acute distress.   HEENT: Normocephalic, atraumatic.  EOMI. No conjunctival injection. No icterus.   HEART: RRR;   LUNGS: Normal respiratory effort. Nonlabored. No dullness.  ABDOMEN: Soft, nontender, nondistended. No rebound or guarding. Erythema improving turning lighter, lateral abdominal wound no foul odor or purulent drainage, packing in place, drain in place      Laboratory  Data:  Results from last 7 days   Lab Units 22  0311 22  0445 22  1222   WBC 10*3/mm3 8.34 15.33* 20.32*   HEMOGLOBIN g/dL 12.5 13.3 13.4   HEMATOCRIT % 37.1 39.6 39.8   PLATELETS 10*3/mm3 245 262 296     Results from last 7 days   Lab Units 22  0311   SODIUM mmol/L 135*   POTASSIUM mmol/L 3.5   CHLORIDE mmol/L 101   CO2 mmol/L 21.0*   BUN mg/dL 12   CREATININE mg/dL 0.56*   GLUCOSE mg/dL 103*   CALCIUM mg/dL 8.7     Results from last 7 days   Lab Units 22  1222   ALK PHOS U/L 80   BILIRUBIN mg/dL 0.4   ALT (SGPT) U/L 12   AST (SGOT) U/L 16             Results from last 7 days   Lab Units 22  1222   LACTATE mmol/L 1.8     Results from last 7 days   Lab Units 22  0445   CK TOTAL U/L 39         Estimated Creatinine Clearance: 105 mL/min (A) (by C-G formula based on SCr of 0.56 mg/dL (L)).    Microbiology:  Blood Culture   Date Value Ref Range Status   2022 No growth at 24 hours  Preliminary   2022 Abnormal Stain (C)  Preliminary     BCID, PCR   Date Value Ref Range Status   2022 Negative by BCID PCR. Culture to Follow. Negative by BCID PCR. Culture to Follow. Final     No results found for: CULTURES, HSVCX, URCX  No results found for: EYECULTURE, GCCX, HSVCULTURE, LABHSV  No results found for: LEGIONELLA, MRSACX, MUMPSCX, MYCOPLASCX  No results found for: NOCARDIACX, STOOLCX  Urine Culture   Date Value Ref Range Status   2022 <25,000 CFU/mL Mixed Monique Isolated  Final     Wound Culture   Date Value Ref Range Status   2022 Scant growth (1+) Streptococcus pyogenes (Group A) (A)  Final     Comment:     This organism is considered to be universally susceptible to penicillin.  No further antibiotic testing will be performed. If Clindamycin or Erythromycin is the drug of choice, notify the laboratory within 7 days to request susceptibility testing.         Assessment: 55yoF  hx DM2, HTN, HLD, GERD, s/p 21 abdominoplasty with a streptococcus  infection s/p 1/12/22 IR drainage and drain placement including bedside washout and debridement of incisional drainage site     Plan:  - no acute surgery at this point in time, IR drained fluid collection followed by drain placement, continue dakins packing to wound   - continue dakins dressing changes to Lt abdominal wound TID  - follow cultures  - Abx per ID  - recommend abdominal binder for compression  - will continue to follow  - dispo recommend one more day in the hospital discharge patient if she looks better tomorrow, go home with Dakins dressing changes BID, patient will need some dressing supplies for the weekend, kerlix, ABD pads, dakins solution  - follow up with Dr. Daniel Buckner 1/18/22 at 3:00 PM             Daniel Buckner MD  01/13/22  12:14 EST

## 2022-01-13 NOTE — PLAN OF CARE
Problem: Adult Inpatient Plan of Care  Goal: Plan of Care Review  Outcome: Ongoing, Progressing  Flowsheets (Taken 1/13/2022 1413)  Progress: improving  Plan of Care Reviewed With: patient  Outcome Summary:   VSS on room air   pt ambulating independently to bathroom and in room   no complaints of pain   dressing changed per MD orders   anticipating discharge tomorrow   will continue to monitor  Goal: Patient-Specific Goal (Individualized)  Outcome: Ongoing, Progressing  Goal: Absence of Hospital-Acquired Illness or Injury  Outcome: Ongoing, Progressing  Intervention: Identify and Manage Fall Risk  Recent Flowsheet Documentation  Taken 1/13/2022 1400 by Denise Madison RN  Safety Promotion/Fall Prevention:   activity supervised   assistive device/personal items within reach   clutter free environment maintained   fall prevention program maintained   nonskid shoes/slippers when out of bed   room organization consistent   safety round/check completed   toileting scheduled  Taken 1/13/2022 1200 by Denise Madison RN  Safety Promotion/Fall Prevention:   activity supervised   assistive device/personal items within reach   clutter free environment maintained   fall prevention program maintained   nonskid shoes/slippers when out of bed   room organization consistent   safety round/check completed   toileting scheduled  Taken 1/13/2022 1000 by Denise Madison RN  Safety Promotion/Fall Prevention:   activity supervised   assistive device/personal items within reach   clutter free environment maintained   fall prevention program maintained   nonskid shoes/slippers when out of bed   room organization consistent   safety round/check completed   toileting scheduled  Taken 1/13/2022 0800 by Denise Madison RN  Safety Promotion/Fall Prevention:   activity supervised   assistive device/personal items within reach   clutter free environment maintained   fall prevention program maintained   nonskid shoes/slippers when  out of bed   room organization consistent   safety round/check completed   toileting scheduled  Intervention: Prevent Skin Injury  Recent Flowsheet Documentation  Taken 1/13/2022 1400 by Denise Madison RN  Body Position: position changed independently  Taken 1/13/2022 1200 by Denise Madison RN  Body Position: position changed independently  Taken 1/13/2022 1000 by Denise Madison RN  Body Position: position changed independently  Taken 1/13/2022 0800 by Denise Madison RN  Body Position: position changed independently  Intervention: Prevent and Manage VTE (venous thromboembolism) Risk  Recent Flowsheet Documentation  Taken 1/13/2022 0800 by Denise Madison RN  VTE Prevention/Management:   bilateral   dorsiflexion/plantar flexion performed   bleeding risk factor(s) identified  Intervention: Prevent Infection  Recent Flowsheet Documentation  Taken 1/13/2022 1400 by Denise Madison RN  Infection Prevention:   rest/sleep promoted   single patient room provided  Taken 1/13/2022 1200 by Denise Madison RN  Infection Prevention:   rest/sleep promoted   single patient room provided  Taken 1/13/2022 1000 by Denise Madison RN  Infection Prevention:   single patient room provided   rest/sleep promoted  Taken 1/13/2022 0800 by Denise Madison RN  Infection Prevention:   single patient room provided   rest/sleep promoted  Goal: Optimal Comfort and Wellbeing  Outcome: Ongoing, Progressing  Intervention: Provide Person-Centered Care  Recent Flowsheet Documentation  Taken 1/13/2022 0800 by Denise Madison RN  Trust Relationship/Rapport:   care explained   choices provided   emotional support provided   empathic listening provided   questions answered   questions encouraged   reassurance provided   thoughts/feelings acknowledged  Goal: Readiness for Transition of Care  Outcome: Ongoing, Progressing     Problem: Diabetes Comorbidity  Goal: Blood Glucose Level Within Desired Range  Outcome: Ongoing,  Progressing     Problem: Hypertension Comorbidity  Goal: Blood Pressure in Desired Range  Outcome: Ongoing, Progressing  Intervention: Maintain Hypertension-Management Strategies  Recent Flowsheet Documentation  Taken 1/13/2022 1400 by Denise Madison RN  Medication Review/Management: medications reviewed  Taken 1/13/2022 1200 by Denise Madison RN  Medication Review/Management: medications reviewed  Taken 1/13/2022 1000 by Denise Madison RN  Medication Review/Management: medications reviewed  Taken 1/13/2022 0800 by Denise Madison RN  Medication Review/Management: medications reviewed   Goal Outcome Evaluation:  Plan of Care Reviewed With: patient        Progress: improving  Outcome Summary: VSS on room air; pt ambulating independently to bathroom and in room; no complaints of pain; dressing changed per MD orders; anticipating discharge tomorrow; will continue to monitor

## 2022-01-13 NOTE — PROGRESS NOTES
INFECTIOUS DISEASE Progress Note    Suze Vaca  1965  [id number]    Admission Date: 1/11/2022      Requesting Provider: No ref. provider found  Evaluating Physician: John Kay MD    Reason for Consultation: abdominal wall abscess    History of present illness:    1/12/22: Patient is a 56 y.o. female, PMH DM, HTN, seen today for an abdominal wall abscess. She underwent an abdominoplasty on 12/13/21.  She had been doing well until Sunday, 1/9/22 when she noted some drainage from her abdominal incision, and then developed a truncal rash. She then developed a fever, notified Dr. Buckner's office and was referred the ED. An abdominal CT revealing focal inflammatory collection in the deep subcutaneous tissues of the abdominal midline, above the umbilicus, suspicious for a small abscess, with diffuse subcutaneous fat stranding which may represent an overlying cellulitis. CXR with hazy interstitial opacity at the lateral right and left mid and lower lungs.  Admitting labs negative COVID PCR, WBC 20.3, PC T 0.87, lactate 1.8, Scr 0.64, K 3.0, and Tmax of 103.2.  She was started on Vancomycin, and Zosyn and we were consulted for evaluation and treatment. She has not used any new soaps, skin products, no new medications.  She has received 2 doses of Moderna vaccine but not her booster. She now has 2 negative Covid 19 PCR studies.   Her abdominal wall culture has grown group A streptococcus.  Her abdominal wall ultrasound reveals a more extensive upper abdominal wall abscess.  She is undergoing ultrasound-guided drainage of the abscess at the present time.    1/13/22: She underwent ultrasound-guided drainage of the upper abdominal wall fluid collection.  She subsequently underwent bedside opening of her incision for improved drainage. Gram stain of the upper abdominal fluid collection revealed few white blood cells with no organisms seen. Maximum temperature over the last 24 hours is 103.1°.  Her  temperature this morning is 100.2. Blood cultures from 1/11 are no growth so far. Her leukocytosis is markedly improved with a white blood cell count of 8.3 this morning. She feels much better today.  She has noted a dramatic improvement in her anterior chest wall and abdominal wall erythema.    Past Medical History:   Diagnosis Date   • Diabetes mellitus (HCC)    • GERD (gastroesophageal reflux disease)    • Hyperlipidemia    • Hypertension    • Wears glasses        Past Surgical History:   Procedure Laterality Date   • ABDOMINOPLASTY N/A 12/13/2021    Procedure: ABDOMINOPLASTY WITH LIPOSUCTION;  Surgeon: Daniel Buckner MD;  Location: [facility];  Service: Plastics;  Laterality: N/A;   • REDUCTION MAMMAPLASTY Bilateral    • VAGINAL DELIVERY         History reviewed. No pertinent family history.    Social History     Socioeconomic History   • Marital status:    Tobacco Use   • Smoking status: Never Smoker   • Smokeless tobacco: Never Used   Vaping Use   • Vaping Use: Never used   Substance and Sexual Activity   • Alcohol use: Yes     Alcohol/week: 1.0 standard drink     Types: 1 Glasses of wine per week     Comment: SOCAILLY   • Drug use: Never       No Known Allergies      Medication:    Current Facility-Administered Medications:   •  acetaminophen (TYLENOL) tablet 650 mg, 650 mg, Oral, Q4H PRN, 650 mg at 01/12/22 1739 **OR** acetaminophen (TYLENOL) 160 MG/5ML solution 650 mg, 650 mg, Oral, Q4H PRN **OR** acetaminophen (TYLENOL) suppository 650 mg, 650 mg, Rectal, Q4H PRN, Zaida White DO  •  cefTRIAXone (ROCEPHIN) 2 g/100 mL 0.9% NS IVPB (MBP), 2 g, Intravenous, Q24H, John Kay MD, 2 g at 01/12/22 2347  •  clindamycin (CLEOCIN) 900 mg in dextrose 5% 50 mL IVPB (premix), 900 mg, Intravenous, Q8H, John Kay MD, Last Rate: 50 mL/hr at 01/13/22 0342, 900 mg at 01/13/22 0342  •  dextrose (D50W) (25 g/50 mL) IV injection 25 g, 25 g, Intravenous, Q15 Min PRN, Zaida White DO  •   dextrose (GLUTOSE) oral gel 15 g, 15 g, Oral, Q15 Min PRN, Zaida White, DO  •  diphenhydrAMINE (BENADRYL) capsule 25 mg, 25 mg, Oral, Q6H PRN, Daniel Buckner MD  •  enoxaparin (LOVENOX) syringe 40 mg, 40 mg, Subcutaneous, Q24H, Zaida White, DO, 40 mg at 01/12/22 1820  •  famotidine (PEPCID) tablet 10 mg, 10 mg, Oral, BID, Zaida White, DO, 10 mg at 01/12/22 2043  •  glucagon (human recombinant) (GLUCAGEN DIAGNOSTIC) injection 1 mg, 1 mg, Subcutaneous, Q15 Min PRN, Zaida White, DO  •  insulin lispro (humaLOG) injection 0-7 Units, 0-7 Units, Subcutaneous, TID AC, Zaida White, DO  •  Magnesium Sulfate 2 gram Bolus, followed by 8 gram infusion (total Mg dose 10 grams)- Mg less than or equal to 1mg/dL, 2 g, Intravenous, PRN **OR** Magnesium Sulfate 2 gram / 50mL Infusion (GIVE X 3 BAGS TO EQUAL 6GM TOTAL DOSE) - Mg 1.1 - 1.5 mg/dl, 2 g, Intravenous, PRN **OR** Magnesium Sulfate 4 gram infusion- Mg 1.6-1.9 mg/dL, 4 g, Intravenous, PRN, Zaida White, DO  •  morphine injection 1 mg, 1 mg, Intravenous, Q4H PRN **AND** naloxone (NARCAN) injection 0.4 mg, 0.4 mg, Intravenous, Q5 Min PRN, Zaida White, DO  •  multivitamin (THERAGRAN) tablet 1 tablet, 1 tablet, Oral, Daily, Bhavana Ross, PharmD  •  ondansetron (ZOFRAN) tablet 4 mg, 4 mg, Oral, Q6H PRN **OR** ondansetron (ZOFRAN) injection 4 mg, 4 mg, Intravenous, Q6H PRN, Zaida White, DO  •  potassium chloride (MICRO-K) CR capsule 40 mEq, 40 mEq, Oral, PRN, 40 mEq at 01/12/22 0019 **OR** potassium chloride (KLOR-CON) packet 40 mEq, 40 mEq, Oral, PRN **OR** potassium chloride 10 mEq in 100 mL IVPB, 10 mEq, Intravenous, Q1H PRN, Zaida White DO  •  rosuvastatin (CRESTOR) tablet 20 mg, 20 mg, Oral, Daily, Zaida White DO, 20 mg at 01/12/22 0910  •  sodium chloride 0.9 % flush 10 mL, 10 mL, Intravenous, Q12H, Zaida White DO, 10 mL at 01/12/22 2044  •  sodium chloride 0.9 % flush 10 mL, 10 mL,  Intravenous, PRN, Zaida White, DO  •  sodium hypochlorite (DAKIN'S 1/4 STRENGTH) 0.125 % topical solution 0.125% solution, , Topical, BID, Daniel Buckner MD, Given at 22    Antibiotics:  Anti-Infectives (From admission, onward)    Ordered     Dose/Rate Route Frequency Start Stop    22 1815  cefTRIAXone (ROCEPHIN) 2 g/100 mL 0.9% NS IVPB (MBP)        Ordering Provider: John Kay MD    2 g  over 30 Minutes Intravenous Every 24 Hours 22 0000 22 2359    22 1816  clindamycin (CLEOCIN) 900 mg in dextrose 5% 50 mL IVPB (premix)        Ordering Provider: John Kay MD    900 mg  50 mL/hr over 60 Minutes Intravenous Every 8 Hours 22 19522 1332  piperacillin-tazobactam (ZOSYN) 3.375 g in iso-osmotic dextrose 50 ml (premix)        Ordering Provider: Naveen Ulloa PA    3.375 g  over 30 Minutes Intravenous Once 22 1334 22 1420    22 1332  vancomycin 1250 mg/250 mL 0.9% NS IVPB ([facility])        Ordering Provider: Naveen Ulloa PA    20 mg/kg × 68 kg Intravenous Once 22 1334 22 1420            Review of Systems:  See HPI      Physical Exam:   Vital Signs  Temp (24hrs), Av.7 °F (37.6 °C), Min:97.6 °F (36.4 °C), Max:103.1 °F (39.5 °C)    Temp  Min: 97.6 °F (36.4 °C)  Max: 103.1 °F (39.5 °C)  BP  Min: 103/60  Max: 151/69  Pulse  Min: 69  Max: 97  Resp  Min: 16  Max: 20  SpO2  Min: 90 %  Max: 100 %    GENERAL: Awake and alert, in no acute distress.   HEENT: Normocephalic, atraumatic.  PERRL. EOMI. No conjunctival injection. No icterus. Oropharynx clear without evidence of thrush or exudate. No evidence of peridontal disease.    NECK: Supple.  HEART: RRR; No murmur, rubs, gallops.   LUNGS: Clear to auscultation bilaterally without wheezing, rales, rhonchi. Normal respiratory effort. Nonlabored. No dullness.  ABDOMEN: Part of her left lower quadrant abdominal incision has been opened and is now packed.   There is marked improvement in the erythema over her upper abdominal wall.  EXT:  No cyanosis, clubbing or edema. No cord.  :  Without Singh catheter.  MSK:  No joint effusions   SKIN: There is marked improvement in the erythema over her breasts and upper abdomen.  NEURO: Oriented to PPT.motor strength 5/5.  PSYCHIATRIC: Normal insight and judgement. Cooperative with PE    Laboratory Data    Results from last 7 days   Lab Units 01/13/22  0311 01/12/22  0445 01/11/22  1222   WBC 10*3/mm3 8.34 15.33* 20.32*   HEMOGLOBIN g/dL 12.5 13.3 13.4   HEMATOCRIT % 37.1 39.6 39.8   PLATELETS 10*3/mm3 245 262 296     Results from last 7 days   Lab Units 01/13/22  0311   SODIUM mmol/L 135*   POTASSIUM mmol/L 3.5   CHLORIDE mmol/L 101   CO2 mmol/L 21.0*   BUN mg/dL 12   CREATININE mg/dL 0.56*   GLUCOSE mg/dL 103*   CALCIUM mg/dL 8.7     Results from last 7 days   Lab Units 01/11/22  1222   ALK PHOS U/L 80   BILIRUBIN mg/dL 0.4   ALT (SGPT) U/L 12   AST (SGOT) U/L 16             Results from last 7 days   Lab Units 01/11/22  1222   LACTATE mmol/L 1.8     Results from last 7 days   Lab Units 01/12/22  0445   CK TOTAL U/L 39         Estimated Creatinine Clearance: 105 mL/min (A) (by C-G formula based on SCr of 0.56 mg/dL (L)).      Microbiology:  No results found for: ACANTHNAEG, AFBCX, BPERTUSSISCX, BLOODCX  No results found for: BCIDPCR, CXREFLEX, CSFCX, CULTURETIS  No results found for: CULTURES, HSVCX, URCX  No results found for: EYECULTURE, GCCX, HSVCULTURE, LABHSV  No results found for: LEGIONELLA, MRSACX, MUMPSCX, MYCOPLASCX  No results found for: NOCARDIACX, STOOLCX  No results found for: THROATCX, UNSTIMCULT, URINECX, CULTURE, VZVCULTUR  No results found for: VIRALCULTU, WOUNDCX        Radiology:  Imaging Results (Last 72 Hours)     Procedure Component Value Units Date/Time    US Guided Abscess Drain Abdomen/Pelvis [[id number]] Collected: 01/12/22 1550     Updated: 01/12/22 1610    Narrative:      US Guided aspiration of  abdominal wall fluid collection and placement of  a drainage catheter.                                                                                                                   History: Postop infected seroma in the anterior abdominal wall                                                     : Derrick Pete MD.     Assistant:  None.                                                                                   Modality: Ultrasound.                                                                                              SEDATION: Moderate sedation was administered. 1 milligram of Versed and  50 micrograms of fentanyl IV was used for moderate sedation monitored  under my direction. Total intra service time of sedation was 13 minutes.  The patient's vital signs were monitored throughout the procedure and  recorded in the patient's medical record by the nurse.     Anesthesia: 1% lidocaine.                 Estimated blood loss:  < 5 cc.              Technique:   A thorough discussion of the risks, benefits, and alternatives of the  procedure, and if applicable, moderate sedation was carried out with the  patient or the patient's next of kin. Any questions were answered. They  verbalized understanding. A written informed consent was then signed.       A timeout was performed prior to starting the procedure.      The procedure room personnel used personal protective equipment. The  operators used sterile gowns and gloves in addition. The surgical site  was prepped with chlorhexidine gluconate and draped in the maximal  sterile fashion.     A preliminary ultrasonography was performed to assess the target and  determine a safe access site. It showed an anterior abdominal wall fluid  collection sandwiched between the fatty and muscular layer with one  large epigastric pocket that continues into a craniocaudad rectangular  layer of fluid in the left anterior abdominal wall.  Pertinent  ultrasound  images were secured to the PACS for documentation.      A supraumbilical midline access site was selected and sterilely prepped  and draped. Local anesthesia was administered.      Using aseptic precautions, real-time ultrasonographic guidance, an 8.5  [place] locking pigtail catheter was advanced into the target in a single  stick trocar-cannula technique followed by performance of aspiration.  Pus dark straw-colored fluid was recovered. Approximately 20 cc of fluid  was aspirated and sent to the lab for examination.     The catheter was secured to skin with nonabsorbable suture, StatLock and  Tegaderm.     After recovery, the patient was discharged from the department in stable  condition.     Complications: None immediate.     Specimen: Sent to the lab                                                                           Impression:      Impression:                 Successful ultrasound-guided aspiration and drainage of a postop seroma  in the anterior abdominal wall.     Thank you for the opportunity to assist in the care of your patient.     This report was finalized on 1/12/2022 4:00 PM by Derrick Pete MD.       CT Chest Without Contrast Diagnostic [[id number]] Collected: 01/12/22 1333     Updated: 01/12/22 1340    Narrative:      EXAMINATION: CT CHEST WO CONTRAST DIAGNOSTIC-      INDICATION: sepsis, concern for GGO on CXR; R50.9-Fever, unspecified;  D72.825-Bandemia; Z98.890-Other specified postprocedural states;  R43.0-Anosmia     TECHNIQUE: Axial noncontrast CT of the chest with multiplanar  reconstruction     The radiation dose reduction device was turned on for each scan per the  ALARA (As Low as Reasonably Achievable) protocol.     COMPARISON: NONE     FINDINGS: Nonspecific edema and lymphadenopathy is present and bilateral  axillary regions, with largest discrete lymph node measuring 2.0 cm long  axis on the right, 18 mm on the left. No acute findings in the partially  imaged upper abdomen. No  pleural or pericardial effusion. No pathologic  mediastinal or hilar lymphadenopathy. Evaluation of the osseous  structures demonstrates no evidence of acute fracture or aggressive  osseous lesion. Mildly atherosclerotic nonaneurysmal thoracic aorta.  Evaluation of the lung fields demonstrates no evidence of acute  infectious or inflammatory process. There are no suspicious pulmonary  nodules.       Impression:      Nonspecific and fairly symmetric bilateral axillary  lymphadenopathy. Finding is of uncertain clinical significance.     Otherwise essentially normal noncontrast CT of the chest. Specifically  there is no evidence of significant groundglass opacity or other  findings to suggest acute infectious or inflammatory process. There is  no suspicious pulmonary nodularity.        This report was finalized on 1/12/2022 1:37 PM by Nick Hamilton.       CT Abdomen Pelvis Without Contrast [[id number]] Collected: 01/11/22 1519     Updated: 01/11/22 2125    Narrative:      EXAMINATION: CT ABDOMEN AND PELVIS WO CONTRAST-01/11/2022:      INDICATION: Leukocytosis; R50.9-Fever, unspecified; D72.825-Bandemia;  Z98.890-Other specified postprocedural states; R43.0-Anosmia.     TECHNIQUE: 5 mm unenhanced images through the abdomen and pelvis.     The radiation dose reduction device was turned on for each scan per the  ALARA (As Low as Reasonably Achievable) protocol.     COMPARISON: NONE.     FINDINGS: The patient indicates leukocytosis, fever, the patient's chart  indicates previous abdominoplasty and liposuction 12/13/2021.     There is diffuse subcutaneous fat stranding of the abdomen, from a level  just above the umbilicus, inferiorly to several centimeters above the  pubic symphysis. There is some low-density change, presumably small  focal dense edema in the deep subcutaneous fat anterior to the  premuscular fascia over a roughly 6 x 12 mm area, somewhat more  cephalad, however, in the midline above the umbilicus, this  appears to  continue as a more well-defined rounded 2.5 cm collection with a single  air bubble, axial images 34-37, potentially a small subcutaneous  abscess. No potential inflammatory focus is identified elsewhere.     The remainder of the scan shows the included lower lungs to appear  clear. There is diffuse fatty liver change. Gallbladder, spleen,  pancreas, adrenal glands, and kidneys appear unremarkable for a non-IV  contrast enhanced exam. No upper abdominal free air, ascites,  adenopathy, or acute inflammatory focus is appreciated. There is no  evidence of abdominal wall hernia.     In the pelvis, large and small bowel loops are normal in caliber and  appearance. The terminal ileum and cecum appear normal. The appendix is  identified with fairly good degree of confidence, images 61-55 and  appears normal. The bladder is moderately distended and normal in  appearance. The uterus and ovaries are not enlarged. No intrapelvic free  fluid or inflammatory.       Impression:      1. Focal inflammatory collection in the deep subcutaneous tissues of the  abdominal midline, above the umbilicus, suspicious for small abscess.  Diffuse subcutaneous fat stranding, which may represent an overlying  cellulitis.     2. No evidence of intra-abdominal inflammatory focus or other  intra-abdominal or intrapelvic disease elsewhere.     D:  01/11/2022  E:  01/11/2022     This report was finalized on 1/11/2022 9:22 PM by Dr. Oliver Sharma MD.       XR Chest 1 View [[id number]] Collected: 01/11/22 1438     Updated: 01/11/22 2102    Narrative:      EXAMINATION: XR CHEST 1 VW-01/11/2022:     INDICATION: Acute febrile illness; R50.9-Fever, unspecified;  D72.825-Bandemia; Z98.890-Other specified postprocedural states;  R43.0-Anosmia.     COMPARISON: NONE.     FINDINGS:  The heart, mediastinum and pulmonary vasculature appear  within normal limits. The lungs appear normally expanded and initially  grossly clear. Slight haziness of the  lateral soft tissues of the right  and left chest appears to be due to overlying breast tissue shadow.  Groundglass changes of Covid-19 pneumonia would have a similar  appearance and distribution however.       Impression:      Hazy interstitial opacity at the lateral right and left mid  and lower lungs. This may simply represent overlying breast tissue  shadow. With history of dyspnea and fever, however, consider groundglass  changes associated with Covid-19 pneumonia.     D:  01/11/2022  E:  01/11/2022            This report was finalized on 1/11/2022 8:59 PM by Dr. Oliver Sharma MD.               Impression:   1.  Group A streptococcal abdominal wall cellulitis/abscess-This is markedly improved.  The upper abdominal wall fluid collection may be a seroma but cultures are pending.  She did have purulence in the left lower quadrant incision with group A streptococcus on culture and this is responsible for her extensive abdominal wall and into her chest wall cellulitis.  She is dramatically improved today.  2.  Sepsis-with hyperpyrexia, leukocytosis/neutrophilia, tachycardia, an elevated pro-calcitonin, and a known focus of infection.  3.  Leukocytosis/neutrophilia-Improved  4.  Type 2 diabetes mellitus  5.  Status post abdominoplasty      PLAN/RECOMMENDATIONS:  1.  Continue intravenous clindamycin  2.  Continue intravenous ceftriaxone   3.  Possible discharge in the morning with acute outpatient care/intravenous ceftriaxone in our office.  4.  Continue wound care      I discussed her complex situation with Dr. Buckner again today  I discussed her situation at length with patient herself today.  I spent over 35 minutes on her complex care today.       John Kay MD  1/13/2022  07:53 EST

## 2022-01-14 VITALS
OXYGEN SATURATION: 96 % | HEIGHT: 64 IN | RESPIRATION RATE: 18 BRPM | DIASTOLIC BLOOD PRESSURE: 86 MMHG | TEMPERATURE: 98.1 F | BODY MASS INDEX: 25.61 KG/M2 | SYSTOLIC BLOOD PRESSURE: 124 MMHG | HEART RATE: 70 BPM | WEIGHT: 150 LBS

## 2022-01-14 PROBLEM — R50.9 ACUTE FEBRILE ILLNESS: Status: RESOLVED | Noted: 2022-01-11 | Resolved: 2022-01-14

## 2022-01-14 PROBLEM — A41.9 SEPSIS, UNSPECIFIED ORGANISM: Status: RESOLVED | Noted: 2022-01-11 | Resolved: 2022-01-14

## 2022-01-14 PROBLEM — R93.89 ABNORMAL CXR: Status: RESOLVED | Noted: 2022-01-11 | Resolved: 2022-01-14

## 2022-01-14 PROBLEM — E87.6 HYPOKALEMIA: Status: RESOLVED | Noted: 2022-01-11 | Resolved: 2022-01-14

## 2022-01-14 LAB
ANION GAP SERPL CALCULATED.3IONS-SCNC: 11 MMOL/L (ref 5–15)
BACTERIA SPEC AEROBE CULT: ABNORMAL
BACTERIA SPEC ANAEROBE CULT: ABNORMAL
BASOPHILS # BLD AUTO: 0.03 10*3/MM3 (ref 0–0.2)
BASOPHILS NFR BLD AUTO: 0.5 % (ref 0–1.5)
BUN SERPL-MCNC: 12 MG/DL (ref 6–20)
BUN/CREAT SERPL: 22.6 (ref 7–25)
CALCIUM SPEC-SCNC: 9.1 MG/DL (ref 8.6–10.5)
CHLORIDE SERPL-SCNC: 107 MMOL/L (ref 98–107)
CO2 SERPL-SCNC: 22 MMOL/L (ref 22–29)
CREAT SERPL-MCNC: 0.53 MG/DL (ref 0.57–1)
DEPRECATED RDW RBC AUTO: 44.3 FL (ref 37–54)
EOSINOPHIL # BLD AUTO: 0.25 10*3/MM3 (ref 0–0.4)
EOSINOPHIL NFR BLD AUTO: 3.8 % (ref 0.3–6.2)
ERYTHROCYTE [DISTWIDTH] IN BLOOD BY AUTOMATED COUNT: 13 % (ref 12.3–15.4)
GFR SERPL CREATININE-BSD FRML MDRD: 119 ML/MIN/1.73
GLUCOSE BLDC GLUCOMTR-MCNC: 111 MG/DL (ref 70–130)
GLUCOSE BLDC GLUCOMTR-MCNC: 95 MG/DL (ref 70–130)
GLUCOSE SERPL-MCNC: 103 MG/DL (ref 65–99)
GRAM STN SPEC: ABNORMAL
HCT VFR BLD AUTO: 40.7 % (ref 34–46.6)
HGB BLD-MCNC: 13.6 G/DL (ref 12–15.9)
IMM GRANULOCYTES # BLD AUTO: 0.01 10*3/MM3 (ref 0–0.05)
IMM GRANULOCYTES NFR BLD AUTO: 0.2 % (ref 0–0.5)
ISOLATED FROM: ABNORMAL
LYMPHOCYTES # BLD AUTO: 2.31 10*3/MM3 (ref 0.7–3.1)
LYMPHOCYTES NFR BLD AUTO: 34.9 % (ref 19.6–45.3)
MCH RBC QN AUTO: 31.1 PG (ref 26.6–33)
MCHC RBC AUTO-ENTMCNC: 33.4 G/DL (ref 31.5–35.7)
MCV RBC AUTO: 92.9 FL (ref 79–97)
MONOCYTES # BLD AUTO: 0.84 10*3/MM3 (ref 0.1–0.9)
MONOCYTES NFR BLD AUTO: 12.7 % (ref 5–12)
NEUTROPHILS NFR BLD AUTO: 3.18 10*3/MM3 (ref 1.7–7)
NEUTROPHILS NFR BLD AUTO: 47.9 % (ref 42.7–76)
NRBC BLD AUTO-RTO: 0 /100 WBC (ref 0–0.2)
PLATELET # BLD AUTO: 282 10*3/MM3 (ref 140–450)
PMV BLD AUTO: 9.1 FL (ref 6–12)
POTASSIUM SERPL-SCNC: 3.8 MMOL/L (ref 3.5–5.2)
POTASSIUM SERPL-SCNC: 3.8 MMOL/L (ref 3.5–5.2)
RBC # BLD AUTO: 4.38 10*6/MM3 (ref 3.77–5.28)
SODIUM SERPL-SCNC: 140 MMOL/L (ref 136–145)
WBC NRBC COR # BLD: 6.62 10*3/MM3 (ref 3.4–10.8)

## 2022-01-14 PROCEDURE — 80048 BASIC METABOLIC PNL TOTAL CA: CPT | Performed by: FAMILY MEDICINE

## 2022-01-14 PROCEDURE — 85025 COMPLETE CBC W/AUTO DIFF WBC: CPT | Performed by: FAMILY MEDICINE

## 2022-01-14 PROCEDURE — 82962 GLUCOSE BLOOD TEST: CPT

## 2022-01-14 PROCEDURE — 84132 ASSAY OF SERUM POTASSIUM: CPT | Performed by: FAMILY MEDICINE

## 2022-01-14 PROCEDURE — 99239 HOSP IP/OBS DSCHRG MGMT >30: CPT | Performed by: FAMILY MEDICINE

## 2022-01-14 PROCEDURE — 25010000002 CEFTRIAXONE PER 250 MG: Performed by: INTERNAL MEDICINE

## 2022-01-14 RX ORDER — SODIUM HYPOCHLORITE 1.25 MG/ML
SOLUTION TOPICAL
Qty: 1000 ML | Refills: 0 | Status: SHIPPED | OUTPATIENT
Start: 2022-01-14

## 2022-01-14 RX ORDER — SACCHAROMYCES BOULARDII 250 MG
250 CAPSULE ORAL 2 TIMES DAILY
Qty: 60 CAPSULE | Refills: 0 | Status: SHIPPED | OUTPATIENT
Start: 2022-01-14 | End: 2022-01-14 | Stop reason: HOSPADM

## 2022-01-14 RX ORDER — L.ACID,PARA/B.BIFIDUM/S.THERM 8B CELL
1 CAPSULE ORAL DAILY
Qty: 30 CAPSULE | Refills: 0 | Status: SHIPPED | OUTPATIENT
Start: 2022-01-14 | End: 2022-02-13

## 2022-01-14 RX ORDER — HYDROCODONE BITARTRATE AND ACETAMINOPHEN 5; 325 MG/1; MG/1
1 TABLET ORAL EVERY 6 HOURS PRN
Qty: 12 TABLET | Refills: 0 | Status: SHIPPED | OUTPATIENT
Start: 2022-01-14 | End: 2022-01-17

## 2022-01-14 RX ADMIN — DAKIN'S SOLUTION 0.125% (QUARTER STRENGTH): 0.12 SOLUTION at 09:37

## 2022-01-14 RX ADMIN — CLINDAMYCIN PHOSPHATE 900 MG: 900 INJECTION, SOLUTION INTRAVENOUS at 03:58

## 2022-01-14 RX ADMIN — FAMOTIDINE 10 MG: 20 TABLET, FILM COATED ORAL at 09:37

## 2022-01-14 RX ADMIN — ROSUVASTATIN CALCIUM 20 MG: 20 TABLET, FILM COATED ORAL at 09:37

## 2022-01-14 RX ADMIN — MULTIVITAMIN TABLET 1 TABLET: TABLET at 09:37

## 2022-01-14 RX ADMIN — SODIUM CHLORIDE, PRESERVATIVE FREE 10 ML: 5 INJECTION INTRAVENOUS at 09:37

## 2022-01-14 RX ADMIN — SODIUM CHLORIDE 2 G: 900 INJECTION INTRAVENOUS at 11:47

## 2022-01-14 NOTE — DISCHARGE SUMMARY
[facility] Medicine Services  DISCHARGE SUMMARY    Patient Name: Suze Vaca  : 1965  MRN: [id number]    Date of Admission: 2022 11:16 AM  Date of Discharge:  2022  Primary Care Physician: Daniel Buckner MD    Consults     Date and Time Order Name Status Description    2022  3:58 PM Inpatient Infectious Diseases Consult Completed     2022  3:50 PM Inpatient Plastic Surgery Consult            Hospital Course     Presenting Problem:   Acute febrile illness [R50.9]    Active Hospital Problems    Diagnosis  POA   • Abscess [L02.91]  Unknown   • Cellulitis of abdominal wall [L03.311]  Unknown   • S/P abdominoplasty [Z98.890]  Not Applicable      Resolved Hospital Problems    Diagnosis Date Resolved POA   • Acute febrile illness [R50.9] 2022 Yes   • Sepsis, unspecified organism (HCC) [A41.9] 2022 Unknown   • Hypokalemia [E87.6] 2022 Unknown   • Abnormal CXR [R93.89] 2022 Unknown          Hospital Course:  Suze Vaca is a 56 y.o. female with PMHx DM2, HTN, HLD, GERD, hx of bilateral breast reduction with excess abdominal skin. Patient had a recent hx of 40 lbs weight loss, felt she was at her ideal weight, and decided to proceed with abdominoplasty to remove excess abdominal skin with Dr Daniel Buckner 21. Patient did well with procedure, was kept overnight for observation, and discharged home the next day. She was given Ancef prior to surgery and was discharged home with 3 more days of Keflex which she completed.  She presents to ED today at direction of Dr Buckner due to rash. Patient states her rash started  (22) night. It did not itch at first but is starting to itch now. She denies any new medications and the only change in her medications has been a decrease in her Jardiance from 25mg to 10mg. No blistering.   Patient has noticed some drainage from her incision.      Sepsis (fever, tachycardia, leukocytosis.  Source: cellulitis/abscess)  Abdominal wall cellulitis  Concern for small abscess above umbilicus   Recent Abdominoplasty   -Dr Daniel Buckner, plastic surgery following  -S/P IR US guided aspiration of abdominal wall fluid collection and placement of TIARRA drain 1/12/22  -S/P bedside debridement and washout of left abdominal incision draining wound per Dr Buckner 1/12/22  -Wound Cx on admission + Strep pyogenes   -IR wound cultures with NGTD   -ID following, discussed with Dr Kay today. Patient will receive Rocephin dose today and then follow-up in [facility] office tomorrow for continued infusion   -Blood Cx x2 1/2 +Micrococcus species but BCID negative    -Continue Dakins solution BID to pack wound  -Follow-up with Dr Buckner 1/18/22 at 3PM     Abnormal CXR on admission   -CXR with possible Ground-Glass changes  -CT chest negative   -COVID test negative x 2  -Isolation precautions DC'd   -Per patient, she is fully vaccinated (has not had booster)      HTN  -Resume home BP meds at DC      DM2  Glucosuria, Proteinuria   -Resume home Jardiance, Metformin   -Hga1c 6.2     GERD  -Continue Pepcid      HLD  -Continue Crestor      Hypokalemia  -Resolved with replacement      ?UTI  -Cx with <25,000 CFUs mixed ferny  -ABX per above  -Pt did complain of some L flank pain on admission      Discharge Follow Up Recommendations for outpatient labs/diagnostics:  -PCP 1 week  -[facility] office tomorrow, 1/15/22 for Rocephin infusion   -Dr Daniel Buckner, 1/18/22 at 3PM    Day of Discharge     HPI:   Patient seen and examined. RN notes reviewed. No acute events overnight. Pt doing well, lying in bed. Really wants to go home today.     Review of Systems  Gen- No fevers, chills  CV- No chest pain, palpitations  Resp- No cough, dyspnea  GI- No N/V/D, abd pain    Vital Signs:   Temp:  [98 °F (36.7 °C)-99.9 °F (37.7 °C)] 98.5 °F (36.9 °C)  Heart Rate:  [67-86] 73  Resp:  [16-18] 16  BP: (123-149)/(65-86) 149/86      Physical Exam:  Constitutional: No  acute distress, awake, alert  HENT: NCAT, mucous membranes moist  Respiratory: Clear to auscultation bilaterally, respiratory effort normal   Cardiovascular: RRR, no murmurs, rubs, or gallops  Gastrointestinal: Positive bowel sounds, soft, nontender, nondistended  Musculoskeletal: No bilateral ankle edema  Psychiatric: Appropriate affect, cooperative  Neurologic: Oriented x 3, strength symmetric in all extremities, Cranial Nerves grossly intact to confrontation, speech clear  Skin: Abdominal wall erythema much improved, L side incision covered with bandage     Pertinent  and/or Most Recent Results     LAB RESULTS:      Lab 01/14/22 0413 01/13/22 0311 01/12/22  0926 01/12/22 0445 01/11/22  1222   WBC 6.62 8.34  --  15.33* 20.32*   HEMOGLOBIN 13.6 12.5  --  13.3 13.4   HEMATOCRIT 40.7 37.1  --  39.6 39.8   PLATELETS 282 245  --  262 296   NEUTROS ABS 3.18 5.96  --  13.72* 18.07*   IMMATURE GRANS (ABS) 0.01 0.02  --  0.07* 0.15*   LYMPHS ABS 2.31 1.11  --  0.60* 1.18   MONOS ABS 0.84 1.10*  --  0.88 0.76   EOS ABS 0.25 0.11  --  0.03 0.12   MCV 92.9 93.9  --  92.1 93.0   PROCALCITONIN  --   --   --  0.77* 0.87*   LACTATE  --   --   --   --  1.8   PROTIME  --   --  14.6*  --   --          Lab 01/14/22 0413 01/13/22 0311 01/12/22 0445 01/11/22  1222   SODIUM 140 135* 135* 132*   POTASSIUM 3.8  3.8 3.5 3.9 3.0*   CHLORIDE 107 101 101 93*   CO2 22.0 21.0* 24.0 24.0   ANION GAP 11.0 13.0 10.0 15.0   BUN 12 12 13 13   CREATININE 0.53* 0.56* 0.79 0.64   GLUCOSE 103* 103* 119* 138*   CALCIUM 9.1 8.7 9.4 9.8   MAGNESIUM  --   --  2.0 2.0   HEMOGLOBIN A1C  --   --   --  6.20*         Lab 01/11/22  1222   TOTAL PROTEIN 7.8   ALBUMIN 4.10   GLOBULIN 3.7   ALT (SGPT) 12   AST (SGOT) 16   BILIRUBIN 0.4   ALK PHOS 80         Lab 01/12/22  0926   PROTIME 14.6*   INR 1.18*                 Brief Urine Lab Results  (Last result in the past 365 days)      Color   Clarity   Blood   Leuk Est   Nitrite   Protein   CREAT   Urine HCG         01/11/22 1342 Yellow   Clear   Trace   Negative   Negative   30 mg/dL (1+)               Microbiology Results (last 10 days)     Procedure Component Value - Date/Time    Body Fluid Culture - Body Fluid, Peritoneum [[id number]] Collected: 01/12/22 1632    Lab Status: Preliminary result Specimen: Body Fluid from Peritoneum Updated: 01/14/22 0744     Body Fluid Culture No growth at 2 days     Gram Stain Few (2+) WBCs seen      No organisms seen    COVID-19, [facility] IN-HOUSE NP/OP SWAB IN UTM/VTM/SALINE TRANSPORT MEDIA 24HR TAT - Swab, Nasopharynx [[id number]]  (Normal) Collected: 01/12/22 0426    Lab Status: Final result Specimen: Swab from Nasopharynx Updated: 01/12/22 1403     COVID19 Not Detected    Narrative:      Fact sheet for providers: https://www.fda.gov/media/537149/download     Fact sheet for patients: https://www.fda.gov/media/097060/download    Test performed by RT PCR.    Anaerobic Culture - Drainage, Abdomen [[id number]]  (Normal) Collected: 01/11/22 2300    Lab Status: Preliminary result Specimen: Drainage from Abdomen Updated: 01/14/22 0729     Anaerobic Culture Screening for Anaerobes    Wound Culture - Drainage, Abdominal Wall [[id number]]  (Abnormal) Collected: 01/11/22 1628    Lab Status: Final result Specimen: Drainage from Abdominal Wall Updated: 01/13/22 0830     Wound Culture Scant growth (1+) Streptococcus pyogenes (Group A)     Comment: This organism is considered to be universally susceptible to penicillin.  No further antibiotic testing will be performed. If Clindamycin or Erythromycin is the drug of choice, notify the laboratory within 7 days to request susceptibility testing.        Gram Stain Moderate (3+) WBCs seen      Rare (1+) Gram positive cocci    Urine Culture - Urine, Urine, Clean Catch [[id number]] Collected: 01/11/22 1342    Lab Status: Final result Specimen: Urine, Clean Catch Updated: 01/12/22 1703     Urine Culture <25,000 CFU/mL Mixed Monique Isolated    Narrative:       Specimen contains mixed organisms of questionable pathogenicity which indicates contamination with commensal ferny.  Further identification is unlikely to provide clinically useful information.  Suggest recollection.    Blood Culture - Blood, Arm, Right [[id number]]  (Normal) Collected: 01/11/22 1222    Lab Status: Preliminary result Specimen: Blood from Arm, Right Updated: 01/13/22 1315     Blood Culture No growth at 2 days    Blood Culture - Blood, Hand, Right [[id number]]  (Abnormal) Collected: 01/11/22 1222    Lab Status: Final result Specimen: Blood from Hand, Right Updated: 01/14/22 0619     Blood Culture Micrococcus species     Isolated from Aerobic Bottle     Gram Stain Aerobic Bottle Gram positive cocci in clusters    Narrative:      Probable contaminant requires clinical correlation, susceptibility not performed unless requested by physician.      Blood Culture ID, PCR - Blood, Hand, Right [[id number]]  (Normal) Collected: 01/11/22 1222    Lab Status: Final result Specimen: Blood from Hand, Right Updated: 01/13/22 0933     BCID, PCR Negative by BCID PCR. Culture to Follow.    COVID PRE-OP / PRE-PROCEDURE SCREENING ORDER (NO ISOLATION) - Swab, Nasopharynx [[id number]]  (Normal) Collected: 01/11/22 1100    Lab Status: Final result Specimen: Swab from Nasopharynx Updated: 01/11/22 1156    Narrative:      The following orders were created for panel order COVID PRE-OP / PRE-PROCEDURE SCREENING ORDER (NO ISOLATION) - Swab, Nasopharynx.  Procedure                               Abnormality         Status                     ---------                               -----------         ------                     COVID-19 and FLU A/B PCR...[[id number]]  Normal              Final result                 Please view results for these tests on the individual orders.    COVID-19 and FLU A/B PCR - Swab, Nasopharynx [[id number]]  (Normal) Collected: 01/11/22 1100    Lab Status: Final result Specimen: Swab from Nasopharynx  Updated: 01/11/22 1156     COVID19 Not Detected     Influenza A PCR Not Detected     Influenza B PCR Not Detected    Narrative:      Fact sheet for providers: https://www.fda.gov/media/268810/download    Fact sheet for patients: https://www.fda.gov/media/506711/download    Test performed by PCR.          CT Abdomen Pelvis Without Contrast    Result Date: 1/11/2022  EXAMINATION: CT ABDOMEN AND PELVIS WO CONTRAST-01/11/2022:  INDICATION: Leukocytosis; R50.9-Fever, unspecified; D72.825-Bandemia; Z98.890-Other specified postprocedural states; R43.0-Anosmia.  TECHNIQUE: 5 mm unenhanced images through the abdomen and pelvis.  The radiation dose reduction device was turned on for each scan per the ALARA (As Low as Reasonably Achievable) protocol.  COMPARISON: NONE.  FINDINGS: The patient indicates leukocytosis, fever, the patient's chart indicates previous abdominoplasty and liposuction 12/13/2021.  There is diffuse subcutaneous fat stranding of the abdomen, from a level just above the umbilicus, inferiorly to several centimeters above the pubic symphysis. There is some low-density change, presumably small focal dense edema in the deep subcutaneous fat anterior to the premuscular fascia over a roughly 6 x 12 mm area, somewhat more cephalad, however, in the midline above the umbilicus, this appears to continue as a more well-defined rounded 2.5 cm collection with a single air bubble, axial images 34-37, potentially a small subcutaneous abscess. No potential inflammatory focus is identified elsewhere.  The remainder of the scan shows the included lower lungs to appear clear. There is diffuse fatty liver change. Gallbladder, spleen, pancreas, adrenal glands, and kidneys appear unremarkable for a non-IV contrast enhanced exam. No upper abdominal free air, ascites, adenopathy, or acute inflammatory focus is appreciated. There is no evidence of abdominal wall hernia.  In the pelvis, large and small bowel loops are normal in  caliber and appearance. The terminal ileum and cecum appear normal. The appendix is identified with fairly good degree of confidence, images 61-55 and appears normal. The bladder is moderately distended and normal in appearance. The uterus and ovaries are not enlarged. No intrapelvic free fluid or inflammatory.      1. Focal inflammatory collection in the deep subcutaneous tissues of the abdominal midline, above the umbilicus, suspicious for small abscess. Diffuse subcutaneous fat stranding, which may represent an overlying cellulitis.  2. No evidence of intra-abdominal inflammatory focus or other intra-abdominal or intrapelvic disease elsewhere.  D:  01/11/2022 E:  01/11/2022  This report was finalized on 1/11/2022 9:22 PM by Dr. Oliver Sharma MD.      CT Chest Without Contrast Diagnostic    Result Date: 1/12/2022  EXAMINATION: CT CHEST WO CONTRAST DIAGNOSTIC-  INDICATION: sepsis, concern for GGO on CXR; R50.9-Fever, unspecified; D72.825-Bandemia; Z98.890-Other specified postprocedural states; R43.0-Anosmia  TECHNIQUE: Axial noncontrast CT of the chest with multiplanar reconstruction  The radiation dose reduction device was turned on for each scan per the ALARA (As Low as Reasonably Achievable) protocol.  COMPARISON: NONE  FINDINGS: Nonspecific edema and lymphadenopathy is present and bilateral axillary regions, with largest discrete lymph node measuring 2.0 cm long axis on the right, 18 mm on the left. No acute findings in the partially imaged upper abdomen. No pleural or pericardial effusion. No pathologic mediastinal or hilar lymphadenopathy. Evaluation of the osseous structures demonstrates no evidence of acute fracture or aggressive osseous lesion. Mildly atherosclerotic nonaneurysmal thoracic aorta. Evaluation of the lung fields demonstrates no evidence of acute infectious or inflammatory process. There are no suspicious pulmonary nodules.      Nonspecific and fairly symmetric bilateral axillary lymphadenopathy.  Finding is of uncertain clinical significance.  Otherwise essentially normal noncontrast CT of the chest. Specifically there is no evidence of significant groundglass opacity or other findings to suggest acute infectious or inflammatory process. There is no suspicious pulmonary nodularity.   This report was finalized on 1/12/2022 1:37 PM by Nick Hamilton.      XR Chest 1 View    Result Date: 1/11/2022  EXAMINATION: XR CHEST 1 VW-01/11/2022:  INDICATION: Acute febrile illness; R50.9-Fever, unspecified; D72.825-Bandemia; Z98.890-Other specified postprocedural states; R43.0-Anosmia.  COMPARISON: NONE.  FINDINGS:  The heart, mediastinum and pulmonary vasculature appear within normal limits. The lungs appear normally expanded and initially grossly clear. Slight haziness of the lateral soft tissues of the right and left chest appears to be due to overlying breast tissue shadow. Groundglass changes of Covid-19 pneumonia would have a similar appearance and distribution however.      Hazy interstitial opacity at the lateral right and left mid and lower lungs. This may simply represent overlying breast tissue shadow. With history of dyspnea and fever, however, consider groundglass changes associated with Covid-19 pneumonia.  D:  01/11/2022 E:  01/11/2022     This report was finalized on 1/11/2022 8:59 PM by Dr. Oliver Sharma MD.      US Guided Abscess Drain Abdomen/Pelvis    Result Date: 1/12/2022  US Guided aspiration of abdominal wall fluid collection and placement of a drainage catheter.                                                                                                             History: Postop infected seroma in the anterior abdominal wall                                               : Derrick Pete MD.  Assistant:  None.                                                                             Modality: Ultrasound.                                                                                          SEDATION: Moderate sedation was administered. 1 milligram of Versed and 50 micrograms of fentanyl IV was used for moderate sedation monitored under my direction. Total intra service time of sedation was 13 minutes. The patient's vital signs were monitored throughout the procedure and recorded in the patient's medical record by the nurse.  Anesthesia: 1% lidocaine.             Estimated blood loss:  < 5 cc.          Technique: A thorough discussion of the risks, benefits, and alternatives of the procedure, and if applicable, moderate sedation was carried out with the patient or the patient's next of kin. Any questions were answered. They verbalized understanding. A written informed consent was then signed.   A timeout was performed prior to starting the procedure.  The procedure room personnel used personal protective equipment. The operators used sterile gowns and gloves in addition. The surgical site was prepped with chlorhexidine gluconate and draped in the maximal sterile fashion.  A preliminary ultrasonography was performed to assess the target and determine a safe access site. It showed an anterior abdominal wall fluid collection sandwiched between the fatty and muscular layer with one large epigastric pocket that continues into a craniocaudad rectangular layer of fluid in the left anterior abdominal wall.  Pertinent ultrasound images were secured to the PACS for documentation.  A supraumbilical midline access site was selected and sterilely prepped and draped. Local anesthesia was administered.  Using aseptic precautions, real-time ultrasonographic guidance, an 8.5 Welsh locking pigtail catheter was advanced into the target in a single stick trocar-cannula technique followed by performance of aspiration. Pus dark straw-colored fluid was recovered. Approximately 20 cc of fluid was aspirated and sent to the lab for examination.  The catheter was secured to skin with nonabsorbable suture, StatLock  and Tegaderm.  After recovery, the patient was discharged from the department in stable condition.  Complications: None immediate.  Specimen: Sent to the lab                                                                         Impression:               Successful ultrasound-guided aspiration and drainage of a postop seroma in the anterior abdominal wall.  Thank you for the opportunity to assist in the care of your patient.  This report was finalized on 1/12/2022 4:00 PM by Derrick Pete MD.                    Plan for Follow-up of Pending Labs/Results: Inbox, ID  Pending Labs     Order Current Status    Anaerobic Culture - Body Fluid, Peritoneum In process    Anaerobic Culture - Drainage, Abdomen Preliminary result    Blood Culture - Blood, Arm, Right Preliminary result    Body Fluid Culture - Body Fluid, Peritoneum Preliminary result        Discharge Details        Discharge Medications      New Medications      Instructions Start Date   cefTRIAXone  Commonly known as: ROCEPHIN   2 g, Intravenous, Every 24 Hours      HYDROcodone-acetaminophen 5-325 MG per tablet  Commonly known as: Norco   1 tablet, Oral, Every 6 Hours PRN      saccharomyces boulardii 250 MG capsule  Commonly known as: Florastor   250 mg, Oral, 2 Times Daily      sodium hypochlorite 0.125 % solution topical solution 0.125%  Commonly known as: DAKIN'S 1/4 STRENGTH   Apply Dakins to gauze then pack wound BID         Continue These Medications      Instructions Start Date   amLODIPine 10 MG tablet  Commonly known as: NORVASC   10 mg, Oral, Daily      famotidine 10 MG tablet  Commonly known as: PEPCID   10 mg, Oral, 2 Times Daily      hydroCHLOROthiazide 25 MG tablet  Commonly known as: HYDRODIURIL   25 mg, Oral, Daily      Jardiance 25 MG tablet tablet  Generic drug: empagliflozin   10 mg, Oral, Daily      metFORMIN 500 MG tablet  Commonly known as: GLUCOPHAGE   500 mg, Oral, 2 Times Daily With Meals      multivitamin with minerals tablet tablet    2 tablets, Oral, Daily      rosuvastatin 20 MG tablet  Commonly known as: CRESTOR   20 mg, Oral, Daily      VALSARTAN PO   320 mg, Oral, Daily             No Known Allergies      Discharge Disposition:  Home or Self Care    Diet:  Hospital:  Diet Order   Procedures   • Diet Regular; Consistent Carbohydrate       Activity:      Restrictions or Other Recommendations: Per Plastic Surgery       CODE STATUS:    Code Status and Medical Interventions:   Ordered at: 01/11/22 9704     Level Of Support Discussed With:    Patient     Code Status (Patient has no pulse and is not breathing):    CPR (Attempt to Resuscitate)     Medical Interventions (Patient has pulse or is breathing):    Full Support       No future appointments.    Additional Instructions for the Follow-ups that You Need to Schedule     Discharge Follow-up with PCP   As directed       Currently Documented PCP:    Daniel Buckner MD    PCP Phone Number:    [phone]     Follow Up Details: 1 week         Discharge Follow-up with Specified Provider: Dr Buckner, Plastic Surgery on 1/18/22 at 3:00PM   As directed      To: Dr Buckner, Plastic Surgery on 1/18/22 at 3:00PM         Discharge Follow-up with Specified Provider: [facility] office 1/15/22 for Rocephin infusion   As directed      To: [facility] office 1/15/22 for Rocephin infusion                     Zaida White DO  01/14/22      Time Spent on Discharge:  I spent  50  minutes on this discharge activity which included: face-to-face encounter with the patient, reviewing the data in the system, coordination of the care with the nursing staff as well as consultants, documentation, and entering orders.

## 2022-01-14 NOTE — PLAN OF CARE
Goal Outcome Evaluation:  Plan of Care Reviewed With: patient        Progress: improving  Outcome Summary: Pt VSS on RA. Only c/o pain when changing dressing. Dressing change performed this AM. Pt tolerated well. Pt educated on dressing change. Pt will be accompanied home by spouse.

## 2022-01-14 NOTE — CASE MANAGEMENT/SOCIAL WORK
Case Management Discharge Note      Final Note: Arrangements have been made for the pt to go to the [facility] office for IV AB infusion and wound care daily. I spoke with Vania at the [facility] office to confirm the times. Please teach the pt wound care at [facility] and send wound care supplies home with her at [facility].         Selected Continued Care - Admitted Since 1/11/2022     Destination    No services have been selected for the patient.              Durable Medical Equipment    No services have been selected for the patient.              Dialysis/Infusion Coordination complete.    Service Provider Selected Services Address Phone Fax Patient Preferred    [facility]  Infusion and IV Therapy [address], [facility] [address] [phone] --          Home Medical Care    No services have been selected for the patient.              Therapy    No services have been selected for the patient.              Community Resources    No services have been selected for the patient.              Community & DME    No services have been selected for the patient.                       Final Discharge Disposition Code: 01 - home or self-care

## 2022-01-14 NOTE — PLAN OF CARE
VSS, c/o soreness to open area of abdominal incision, PRN tylenol given, tolerated dressing change well, rested well this shift

## 2022-01-14 NOTE — PROGRESS NOTES
INFECTIOUS DISEASE Progress Note    Suze Vaca  1965  [id number]    Admission Date: 1/11/2022      Requesting Provider: No ref. provider found  Evaluating Physician: John Kay MD    Reason for Consultation: abdominal wall abscess    History of present illness:    1/12/22: Patient is a 56 y.o. female, PMH DM, HTN, seen today for an abdominal wall abscess. She underwent an abdominoplasty on 12/13/21.  She had been doing well until Sunday, 1/9/22 when she noted some drainage from her abdominal incision, and then developed a truncal rash. She then developed a fever, notified Dr. Buckner's office and was referred the ED. An abdominal CT revealing focal inflammatory collection in the deep subcutaneous tissues of the abdominal midline, above the umbilicus, suspicious for a small abscess, with diffuse subcutaneous fat stranding which may represent an overlying cellulitis. CXR with hazy interstitial opacity at the lateral right and left mid and lower lungs.  Admitting labs negative COVID PCR, WBC 20.3, PC T 0.87, lactate 1.8, Scr 0.64, K 3.0, and Tmax of 103.2.  She was started on Vancomycin, and Zosyn and we were consulted for evaluation and treatment. She has not used any new soaps, skin products, no new medications.  She has received 2 doses of Moderna vaccine but not her booster. She now has 2 negative Covid 19 PCR studies.   Her abdominal wall culture has grown group A streptococcus.  Her abdominal wall ultrasound reveals a more extensive upper abdominal wall abscess.  She is undergoing ultrasound-guided drainage of the abscess at the present time.    1/13/22: She underwent ultrasound-guided drainage of the upper abdominal wall fluid collection.  She subsequently underwent bedside opening of her incision for improved drainage. Gram stain of the upper abdominal fluid collection revealed few white blood cells with no organisms seen. Maximum temperature over the last 24 hours is 103.1°.  Her  temperature this morning is 100.2. Blood cultures from 1/11 are no growth so far. Her leukocytosis is markedly improved with a white blood cell count of 8.3 this morning. She feels much better today.  She has noted a dramatic improvement in her anterior chest wall and abdominal wall erythema.    1/14/22: Her fevers are abating-her maximum temperature over the last 24 hours is 99.9°. Her white blood cell count today is 6.6 and her creatinine is 0.53. The upper abdominal fluid culture is no growth at 2 days. Her blood culture is growing micrococcus.  She feels markedly better.  She would like to go home today.  She has decreased abdominal pain.  She has only 5 cc of serous drainage overnight from the upper abdominal seroma.  There is still a TIARRA drain in place.  She denies nausea, vomiting, diarrhea.    Past Medical History:   Diagnosis Date   • Diabetes mellitus (HCC)    • GERD (gastroesophageal reflux disease)    • Hyperlipidemia    • Hypertension    • Wears glasses        Past Surgical History:   Procedure Laterality Date   • ABDOMINOPLASTY N/A 12/13/2021    Procedure: ABDOMINOPLASTY WITH LIPOSUCTION;  Surgeon: Daniel Buckner MD;  Location: [facility];  Service: Plastics;  Laterality: N/A;   • REDUCTION MAMMAPLASTY Bilateral    • VAGINAL DELIVERY         History reviewed. No pertinent family history.    Social History     Socioeconomic History   • Marital status:    Tobacco Use   • Smoking status: Never Smoker   • Smokeless tobacco: Never Used   Vaping Use   • Vaping Use: Never used   Substance and Sexual Activity   • Alcohol use: Yes     Alcohol/week: 1.0 standard drink     Types: 1 Glasses of wine per week     Comment: SOCAILLY   • Drug use: Never       No Known Allergies      Medication:    Current Facility-Administered Medications:   •  acetaminophen (TYLENOL) tablet 650 mg, 650 mg, Oral, Q4H PRN, 650 mg at 01/13/22 2127 **OR** acetaminophen (TYLENOL) 160 MG/5ML solution 650 mg, 650 mg, Oral, Q4H PRN  **OR** acetaminophen (TYLENOL) suppository 650 mg, 650 mg, Rectal, Q4H PRN, Zaida White DO  •  cefTRIAXone (ROCEPHIN) 2 g/100 mL 0.9% NS IVPB (MBP), 2 g, Intravenous, Q24H, John Kay MD, 2 g at 01/13/22 2127  •  clindamycin (CLEOCIN) 900 mg in dextrose 5% 50 mL IVPB (premix), 900 mg, Intravenous, Q8H, John Kay MD, Last Rate: 50 mL/hr at 01/14/22 0358, 900 mg at 01/14/22 0358  •  dextrose (D50W) (25 g/50 mL) IV injection 25 g, 25 g, Intravenous, Q15 Min PRN, Zaida White DO  •  dextrose (GLUTOSE) oral gel 15 g, 15 g, Oral, Q15 Min PRN, Zaida White DO  •  diphenhydrAMINE (BENADRYL) capsule 25 mg, 25 mg, Oral, Q6H PRN, Daniel Buckner MD  •  enoxaparin (LOVENOX) syringe 40 mg, 40 mg, Subcutaneous, Q24H, Zaida White DO, 40 mg at 01/13/22 1717  •  famotidine (PEPCID) tablet 10 mg, 10 mg, Oral, BID, Zaida White DO, 10 mg at 01/13/22 2127  •  glucagon (human recombinant) (GLUCAGEN DIAGNOSTIC) injection 1 mg, 1 mg, Subcutaneous, Q15 Min PRN, Zaida White DO  •  insulin lispro (humaLOG) injection 0-7 Units, 0-7 Units, Subcutaneous, TID AC, Zaida White DO  •  Magnesium Sulfate 2 gram Bolus, followed by 8 gram infusion (total Mg dose 10 grams)- Mg less than or equal to 1mg/dL, 2 g, Intravenous, PRN **OR** Magnesium Sulfate 2 gram / 50mL Infusion (GIVE X 3 BAGS TO EQUAL 6GM TOTAL DOSE) - Mg 1.1 - 1.5 mg/dl, 2 g, Intravenous, PRN **OR** Magnesium Sulfate 4 gram infusion- Mg 1.6-1.9 mg/dL, 4 g, Intravenous, PRN, Zaida White,   •  melatonin tablet 5 mg, 5 mg, Oral, Nightly, Zaida White DO, 5 mg at 01/13/22 2127  •  morphine injection 1 mg, 1 mg, Intravenous, Q4H PRN **AND** naloxone (NARCAN) injection 0.4 mg, 0.4 mg, Intravenous, Q5 Min PRN, Zaida White DO  •  multivitamin (THERAGRAN) tablet 1 tablet, 1 tablet, Oral, Daily, Bhavana Ross, PharmD, 1 tablet at 01/13/22 0817  •  ondansetron (ZOFRAN) tablet 4 mg, 4 mg, Oral, Q6H PRN  **OR** ondansetron (ZOFRAN) injection 4 mg, 4 mg, Intravenous, Q6H PRN, Zaida White, DO  •  potassium chloride (MICRO-K) CR capsule 40 mEq, 40 mEq, Oral, PRN, 40 mEq at 22 1222 **OR** potassium chloride (KLOR-CON) packet 40 mEq, 40 mEq, Oral, PRN **OR** potassium chloride 10 mEq in 100 mL IVPB, 10 mEq, Intravenous, Q1H PRN, Zaida White, DO  •  rosuvastatin (CRESTOR) tablet 20 mg, 20 mg, Oral, Daily, Zaida White, , 20 mg at 22 0817  •  sodium chloride 0.9 % flush 10 mL, 10 mL, Intravenous, Q12H, Zaida White, DO, 10 mL at 220  •  sodium chloride 0.9 % flush 10 mL, 10 mL, Intravenous, PRN, Zaida White, DO  •  sodium hypochlorite (DAKIN'S 1/4 STRENGTH) 0.125 % topical solution 0.125% solution, , Topical, BID, Daniel Buckner MD, Given at 22    Antibiotics:  Anti-Infectives (From admission, onward)    Ordered     Dose/Rate Route Frequency Start Stop    22 181  cefTRIAXone (ROCEPHIN) 2 g/100 mL 0.9% NS IVPB (MBP)        Ordering Provider: John Kay MD    2 g  over 30 Minutes Intravenous Every 24 Hours 22 0000 22 181  clindamycin (CLEOCIN) 900 mg in dextrose 5% 50 mL IVPB (premix)        Ordering Provider: John Kay MD    900 mg  50 mL/hr over 60 Minutes Intravenous Every 8 Hours 22 19522 133  piperacillin-tazobactam (ZOSYN) 3.375 g in iso-osmotic dextrose 50 ml (premix)        Ordering Provider: Naveen Ulloa PA    3.375 g  over 30 Minutes Intravenous Once 22 1334 22 1420    22 1332  vancomycin 1250 mg/250 mL 0.9% NS IVPB ([facility])        Ordering Provider: Naveen Ulloa PA    20 mg/kg × 68 kg Intravenous Once 22 1334 22 1420            Review of Systems:  See HPI      Physical Exam:   Vital Signs  Temp (24hrs), Av.7 °F (37.1 °C), Min:98 °F (36.7 °C), Max:99.9 °F (37.7 °C)    Temp  Min: 98 °F (36.7 °C)  Max: 99.9 °F (37.7  °C)  BP  Min: 123/65  Max: 149/86  Pulse  Min: 67  Max: 86  Resp  Min: 16  Max: 18  SpO2  Min: 92 %  Max: 94 %    GENERAL: Awake and alert, in no acute distress.   HEENT: Normocephalic, atraumatic.  PERRL. EOMI. No conjunctival injection. No icterus. Oropharynx clear without evidence of thrush or exudate. No evidence of peridontal disease.    NECK: Supple.  HEART: RRR; No murmur,..   LUNGS: Clear to auscultation bilaterally without wheezing, rales, rhonchi. Normal respiratory effort. Nonlabored.  ABDOMEN: Part of her left lower quadrant abdominal incision has been opened and is now packed.  There is decreased drainage from this area.  There is marked continued improvement in the erythema over her upper abdominal wall.  EXT:  No cyanosis, clubbing or edema.   :  Without Singh catheter.  MSK:  No joint effusions   SKIN: There is marked improvement in the erythema over her breasts and upper abdomen.  NEURO: Oriented to PPT.motor strength 5/5.  PSYCHIATRIC: Normal insight and judgement. Cooperative with PE    Laboratory Data    Results from last 7 days   Lab Units 01/14/22  0413 01/13/22  0311 01/12/22  0445   WBC 10*3/mm3 6.62 8.34 15.33*   HEMOGLOBIN g/dL 13.6 12.5 13.3   HEMATOCRIT % 40.7 37.1 39.6   PLATELETS 10*3/mm3 282 245 262     Results from last 7 days   Lab Units 01/14/22  0413   SODIUM mmol/L 140   POTASSIUM mmol/L 3.8  3.8   CHLORIDE mmol/L 107   CO2 mmol/L 22.0   BUN mg/dL 12   CREATININE mg/dL 0.53*   GLUCOSE mg/dL 103*   CALCIUM mg/dL 9.1     Results from last 7 days   Lab Units 01/11/22  1222   ALK PHOS U/L 80   BILIRUBIN mg/dL 0.4   ALT (SGPT) U/L 12   AST (SGOT) U/L 16             Results from last 7 days   Lab Units 01/11/22  1222   LACTATE mmol/L 1.8     Results from last 7 days   Lab Units 01/12/22  0445   CK TOTAL U/L 39         Estimated Creatinine Clearance: 110.9 mL/min (A) (by C-G formula based on SCr of 0.53 mg/dL (L)).      Microbiology:  No results found for: ACANTHNAEG, AFBCX,  BPERTUSSISCX, BLOODCX  No results found for: BCIDPCR, CXREFLEX, CSFCX, CULTURETIS  No results found for: CULTURES, HSVCX, URCX  No results found for: EYECULTURE, GCCX, HSVCULTURE, LABHSV  No results found for: LEGIONELLA, MRSACX, MUMPSCX, MYCOPLASCX  No results found for: NOCARDIACX, STOOLCX  No results found for: THROATCX, UNSTIMCULT, URINECX, CULTURE, VZVCULTUR  No results found for: VIRALCULTU, WOUNDCX        Radiology:  Imaging Results (Last 72 Hours)     Procedure Component Value Units Date/Time    US Guided Abscess Drain Abdomen/Pelvis [[id number]] Collected: 01/12/22 1550     Updated: 01/12/22 1610    Narrative:      US Guided aspiration of abdominal wall fluid collection and placement of  a drainage catheter.                                                                                                                   History: Postop infected seroma in the anterior abdominal wall                                                     : Derrick Pete MD.     Assistant:  None.                                                                                   Modality: Ultrasound.                                                                                              SEDATION: Moderate sedation was administered. 1 milligram of Versed and  50 micrograms of fentanyl IV was used for moderate sedation monitored  under my direction. Total intra service time of sedation was 13 minutes.  The patient's vital signs were monitored throughout the procedure and  recorded in the patient's medical record by the nurse.     Anesthesia: 1% lidocaine.                 Estimated blood loss:  < 5 cc.              Technique:   A thorough discussion of the risks, benefits, and alternatives of the  procedure, and if applicable, moderate sedation was carried out with the  patient or the patient's next of kin. Any questions were answered. They  verbalized understanding. A written informed consent was then signed.        A timeout was performed prior to starting the procedure.      The procedure room personnel used personal protective equipment. The  operators used sterile gowns and gloves in addition. The surgical site  was prepped with chlorhexidine gluconate and draped in the maximal  sterile fashion.     A preliminary ultrasonography was performed to assess the target and  determine a safe access site. It showed an anterior abdominal wall fluid  collection sandwiched between the fatty and muscular layer with one  large epigastric pocket that continues into a craniocaudad rectangular  layer of fluid in the left anterior abdominal wall.  Pertinent  ultrasound images were secured to the PACS for documentation.      A supraumbilical midline access site was selected and sterilely prepped  and draped. Local anesthesia was administered.      Using aseptic precautions, real-time ultrasonographic guidance, an 8.5  [place] locking pigtail catheter was advanced into the target in a single  stick trocar-cannula technique followed by performance of aspiration.  Pus dark straw-colored fluid was recovered. Approximately 20 cc of fluid  was aspirated and sent to the lab for examination.     The catheter was secured to skin with nonabsorbable suture, StatLock and  Tegaderm.     After recovery, the patient was discharged from the department in stable  condition.     Complications: None immediate.     Specimen: Sent to the lab                                                                           Impression:      Impression:                 Successful ultrasound-guided aspiration and drainage of a postop seroma  in the anterior abdominal wall.     Thank you for the opportunity to assist in the care of your patient.     This report was finalized on 1/12/2022 4:00 PM by Derrick Pete MD.       CT Chest Without Contrast Diagnostic [[id number]] Collected: 01/12/22 1333     Updated: 01/12/22 1340    Narrative:      EXAMINATION: CT CHEST WO CONTRAST  DIAGNOSTIC-      INDICATION: sepsis, concern for GGO on CXR; R50.9-Fever, unspecified;  D72.825-Bandemia; Z98.890-Other specified postprocedural states;  R43.0-Anosmia     TECHNIQUE: Axial noncontrast CT of the chest with multiplanar  reconstruction     The radiation dose reduction device was turned on for each scan per the  ALARA (As Low as Reasonably Achievable) protocol.     COMPARISON: NONE     FINDINGS: Nonspecific edema and lymphadenopathy is present and bilateral  axillary regions, with largest discrete lymph node measuring 2.0 cm long  axis on the right, 18 mm on the left. No acute findings in the partially  imaged upper abdomen. No pleural or pericardial effusion. No pathologic  mediastinal or hilar lymphadenopathy. Evaluation of the osseous  structures demonstrates no evidence of acute fracture or aggressive  osseous lesion. Mildly atherosclerotic nonaneurysmal thoracic aorta.  Evaluation of the lung fields demonstrates no evidence of acute  infectious or inflammatory process. There are no suspicious pulmonary  nodules.       Impression:      Nonspecific and fairly symmetric bilateral axillary  lymphadenopathy. Finding is of uncertain clinical significance.     Otherwise essentially normal noncontrast CT of the chest. Specifically  there is no evidence of significant groundglass opacity or other  findings to suggest acute infectious or inflammatory process. There is  no suspicious pulmonary nodularity.        This report was finalized on 1/12/2022 1:37 PM by Nick Hamilton.       CT Abdomen Pelvis Without Contrast [[id number]] Collected: 01/11/22 1519     Updated: 01/11/22 2125    Narrative:      EXAMINATION: CT ABDOMEN AND PELVIS WO CONTRAST-01/11/2022:      INDICATION: Leukocytosis; R50.9-Fever, unspecified; D72.825-Bandemia;  Z98.890-Other specified postprocedural states; R43.0-Anosmia.     TECHNIQUE: 5 mm unenhanced images through the abdomen and pelvis.     The radiation dose reduction device was  turned on for each scan per the  ALARA (As Low as Reasonably Achievable) protocol.     COMPARISON: NONE.     FINDINGS: The patient indicates leukocytosis, fever, the patient's chart  indicates previous abdominoplasty and liposuction 12/13/2021.     There is diffuse subcutaneous fat stranding of the abdomen, from a level  just above the umbilicus, inferiorly to several centimeters above the  pubic symphysis. There is some low-density change, presumably small  focal dense edema in the deep subcutaneous fat anterior to the  premuscular fascia over a roughly 6 x 12 mm area, somewhat more  cephalad, however, in the midline above the umbilicus, this appears to  continue as a more well-defined rounded 2.5 cm collection with a single  air bubble, axial images 34-37, potentially a small subcutaneous  abscess. No potential inflammatory focus is identified elsewhere.     The remainder of the scan shows the included lower lungs to appear  clear. There is diffuse fatty liver change. Gallbladder, spleen,  pancreas, adrenal glands, and kidneys appear unremarkable for a non-IV  contrast enhanced exam. No upper abdominal free air, ascites,  adenopathy, or acute inflammatory focus is appreciated. There is no  evidence of abdominal wall hernia.     In the pelvis, large and small bowel loops are normal in caliber and  appearance. The terminal ileum and cecum appear normal. The appendix is  identified with fairly good degree of confidence, images 61-55 and  appears normal. The bladder is moderately distended and normal in  appearance. The uterus and ovaries are not enlarged. No intrapelvic free  fluid or inflammatory.       Impression:      1. Focal inflammatory collection in the deep subcutaneous tissues of the  abdominal midline, above the umbilicus, suspicious for small abscess.  Diffuse subcutaneous fat stranding, which may represent an overlying  cellulitis.     2. No evidence of intra-abdominal inflammatory focus or  other  intra-abdominal or intrapelvic disease elsewhere.     D:  01/11/2022  E:  01/11/2022     This report was finalized on 1/11/2022 9:22 PM by Dr. Oliver Sharma MD.       XR Chest 1 View [[id number]] Collected: 01/11/22 1438     Updated: 01/11/22 2102    Narrative:      EXAMINATION: XR CHEST 1 VW-01/11/2022:     INDICATION: Acute febrile illness; R50.9-Fever, unspecified;  D72.825-Bandemia; Z98.890-Other specified postprocedural states;  R43.0-Anosmia.     COMPARISON: NONE.     FINDINGS:  The heart, mediastinum and pulmonary vasculature appear  within normal limits. The lungs appear normally expanded and initially  grossly clear. Slight haziness of the lateral soft tissues of the right  and left chest appears to be due to overlying breast tissue shadow.  Groundglass changes of Covid-19 pneumonia would have a similar  appearance and distribution however.       Impression:      Hazy interstitial opacity at the lateral right and left mid  and lower lungs. This may simply represent overlying breast tissue  shadow. With history of dyspnea and fever, however, consider groundglass  changes associated with Covid-19 pneumonia.     D:  01/11/2022  E:  01/11/2022            This report was finalized on 1/11/2022 8:59 PM by Dr. Oliver Sharma MD.               Impression:   1.  Group A streptococcal abdominal wall cellulitis/abscess-This is markedly improved.  Her cellulitis is markedly improved.  She clearly has a small left lower quadrant incisional abscess infected by group A streptococcus that resulted in her severe abdominal wall cellulitis.  She has an upper abdominal wall seroma that may not be infected.  I may be able to give her a shorter course of intravenous antibiotic therapy than I had previously planned.  She should be able to discharge today.  2.  Sepsis- improved  3.  Leukocytosis/neutrophilia-Improved  4.  Type 2 diabetes mellitus  5.  Status post abdominoplasty  6.  Positive blood culture-this is a micrococcus skin  contaminant.      PLAN/RECOMMENDATIONS:  1.  Discontinue intravenous clindamycin  2.  Continue intravenous ceftriaxone-I will change her dose today to noon so that we can discharge her this afternoon  3.  Discharge to home today  4.  Acute outpatient care with IV ceftriaxone daily in our office via peripheral IV starting tomorrow  5.  Follow-up with me on Tuesday 1/18      I discussed her complex situation with Dr. Buckner again today  I discussed her situation at length with patient herself today.  I spent over 35 minutes on her complex care today.     Outpatient orders:  1.  Have her come to our office starting tomorrow Saturday 1/15 at 830 for: Rocephin 2 g IV daily via peripheral IV  2.  Appointment to see me on Tuesday 1/18 at 1130-this appointment has been made  3.  Saline gauze wound dressing changes for the left lower quadrant abdominal wound daily in our office  4.  CBC, CMP, ESR, CRP on Monday    I discussed her complex situation and disposition with Dr. Daniel Buckner.  I discussed her situation and disposition with Dr. Zaida White.  I discussed her disposition with the nursing staff.  I coordinated her care.  I spent over 35 minutes on her care today.    John Kay MD  1/14/2022  07:58 EST

## 2022-01-15 LAB
BACTERIA FLD CULT: NORMAL
GRAM STN SPEC: NORMAL
GRAM STN SPEC: NORMAL

## 2022-01-16 LAB — BACTERIA SPEC AEROBE CULT: NORMAL

## 2022-01-17 ENCOUNTER — LAB (OUTPATIENT)
Dept: LAB | Facility: HOSPITAL | Age: 57
End: 2022-01-17

## 2022-01-17 ENCOUNTER — TRANSCRIBE ORDERS (OUTPATIENT)
Dept: LAB | Facility: HOSPITAL | Age: 57
End: 2022-01-17

## 2022-01-17 DIAGNOSIS — L03.311 CELLULITIS OF ABDOMINAL WALL: ICD-10-CM

## 2022-01-17 DIAGNOSIS — B95.0 BACTERIAL INFECTION DUE TO STREPTOCOCCUS, GROUP A: ICD-10-CM

## 2022-01-17 DIAGNOSIS — L03.311 CELLULITIS OF ABDOMINAL WALL: Primary | ICD-10-CM

## 2022-01-17 LAB
ALBUMIN SERPL-MCNC: 4.5 G/DL (ref 3.5–5.2)
ALBUMIN/GLOB SERPL: 1.1 G/DL
ALP SERPL-CCNC: 95 U/L (ref 39–117)
ALT SERPL W P-5'-P-CCNC: 24 U/L (ref 1–33)
ANION GAP SERPL CALCULATED.3IONS-SCNC: 15 MMOL/L (ref 5–15)
AST SERPL-CCNC: 20 U/L (ref 1–32)
BACTERIA SPEC ANAEROBE CULT: NORMAL
BASOPHILS # BLD AUTO: 0.05 10*3/MM3 (ref 0–0.2)
BASOPHILS NFR BLD AUTO: 0.6 % (ref 0–1.5)
BILIRUB SERPL-MCNC: 0.3 MG/DL (ref 0–1.2)
BUN SERPL-MCNC: 8 MG/DL (ref 6–20)
BUN/CREAT SERPL: 13.3 (ref 7–25)
CALCIUM SPEC-SCNC: 10.2 MG/DL (ref 8.6–10.5)
CHLORIDE SERPL-SCNC: 97 MMOL/L (ref 98–107)
CO2 SERPL-SCNC: 27 MMOL/L (ref 22–29)
CREAT SERPL-MCNC: 0.6 MG/DL (ref 0.57–1)
CRP SERPL-MCNC: 3.18 MG/DL (ref 0–0.5)
DEPRECATED RDW RBC AUTO: 42.2 FL (ref 37–54)
EOSINOPHIL # BLD AUTO: 0.23 10*3/MM3 (ref 0–0.4)
EOSINOPHIL NFR BLD AUTO: 2.6 % (ref 0.3–6.2)
ERYTHROCYTE [DISTWIDTH] IN BLOOD BY AUTOMATED COUNT: 12.6 % (ref 12.3–15.4)
ERYTHROCYTE [SEDIMENTATION RATE] IN BLOOD: 44 MM/HR (ref 0–30)
GFR SERPL CREATININE-BSD FRML MDRD: 103 ML/MIN/1.73
GLOBULIN UR ELPH-MCNC: 4.1 GM/DL
GLUCOSE SERPL-MCNC: 97 MG/DL (ref 65–99)
HCT VFR BLD AUTO: 42.3 % (ref 34–46.6)
HGB BLD-MCNC: 14.2 G/DL (ref 12–15.9)
IMM GRANULOCYTES # BLD AUTO: 0.12 10*3/MM3 (ref 0–0.05)
IMM GRANULOCYTES NFR BLD AUTO: 1.4 % (ref 0–0.5)
LYMPHOCYTES # BLD AUTO: 3.14 10*3/MM3 (ref 0.7–3.1)
LYMPHOCYTES NFR BLD AUTO: 35.4 % (ref 19.6–45.3)
MCH RBC QN AUTO: 30.7 PG (ref 26.6–33)
MCHC RBC AUTO-ENTMCNC: 33.6 G/DL (ref 31.5–35.7)
MCV RBC AUTO: 91.4 FL (ref 79–97)
MONOCYTES # BLD AUTO: 0.9 10*3/MM3 (ref 0.1–0.9)
MONOCYTES NFR BLD AUTO: 10.1 % (ref 5–12)
NEUTROPHILS NFR BLD AUTO: 4.43 10*3/MM3 (ref 1.7–7)
NEUTROPHILS NFR BLD AUTO: 49.9 % (ref 42.7–76)
NRBC BLD AUTO-RTO: 0 /100 WBC (ref 0–0.2)
PLAT MORPH BLD: NORMAL
PLATELET # BLD AUTO: 405 10*3/MM3 (ref 140–450)
PMV BLD AUTO: 8.6 FL (ref 6–12)
POTASSIUM SERPL-SCNC: 3.8 MMOL/L (ref 3.5–5.2)
PROT SERPL-MCNC: 8.6 G/DL (ref 6–8.5)
RBC # BLD AUTO: 4.63 10*6/MM3 (ref 3.77–5.28)
RBC MORPH BLD: NORMAL
SODIUM SERPL-SCNC: 139 MMOL/L (ref 136–145)
WBC MORPH BLD: NORMAL
WBC NRBC COR # BLD: 8.87 10*3/MM3 (ref 3.4–10.8)

## 2022-01-17 PROCEDURE — 80053 COMPREHEN METABOLIC PANEL: CPT

## 2022-01-17 PROCEDURE — 85007 BL SMEAR W/DIFF WBC COUNT: CPT

## 2022-01-17 PROCEDURE — 85652 RBC SED RATE AUTOMATED: CPT

## 2022-01-17 PROCEDURE — 86140 C-REACTIVE PROTEIN: CPT

## 2022-01-17 PROCEDURE — 85025 COMPLETE CBC W/AUTO DIFF WBC: CPT

## 2022-01-17 PROCEDURE — 36415 COLL VENOUS BLD VENIPUNCTURE: CPT

## (undated) DEVICE — PK CHST BRST 10

## (undated) DEVICE — SYS CLS SKIN PREMIERPRO EXOFINFUSION 22CM

## (undated) DEVICE — ELECTRD BLD EZ CLN MOD XLNG 2.75IN

## (undated) DEVICE — SUT ETHLN 3/0 PC5 18IN 1893G

## (undated) DEVICE — TOWEL,OR,DSP,ST,BLUE,STD,4/PK,20PK/CS: Brand: MEDLINE

## (undated) DEVICE — PATIENT RETURN ELECTRODE, SINGLE-USE, CONTACT QUALITY MONITORING, ADULT, WITH 9FT CORD, FOR PATIENTS WEIGING OVER 33LBS. (15KG): Brand: MEGADYNE

## (undated) DEVICE — BANDAGE,GAUZE,BULKEE II,4.5"X4.1YD,STRL: Brand: MEDLINE

## (undated) DEVICE — SYR CONTRL PRESS/LO FIX/M/LL W/THMB/RNG 10ML

## (undated) DEVICE — SUT ETHLN 0 LP XLH 72IN D5854

## (undated) DEVICE — SUT MNCRYL 5/0 P3 18IN UD MCP493G

## (undated) DEVICE — TOTAL TRAY, 16FR 10ML SIL FOLEY, URN: Brand: MEDLINE

## (undated) DEVICE — TRAP FLD MINIVAC MEGADYNE 100ML

## (undated) DEVICE — GLV SURG SENSICARE W/ALOE PF LF 7.5 STRL

## (undated) DEVICE — DRSNG PAD ABD 8X10IN STRL

## (undated) DEVICE — INTENDED FOR TISSUE SEPARATION, AND OTHER PROCEDURES THAT REQUIRE A SHARP SURGICAL BLADE TO PUNCTURE OR CUT.: Brand: BARD-PARKER ® STAINLESS STEEL BLADES

## (undated) DEVICE — SUT ETHIB 0/0 MO6 I8IN CX45D

## (undated) DEVICE — TBG PENCL TELESCP MEGADYNE SMOKE EVAC 10FT

## (undated) DEVICE — JACKSON-PRATT 100CC BULB RESERVOIR: Brand: CARDINAL HEALTH

## (undated) DEVICE — INTENDED USE FOR SURGICAL MARKING ON INTACT SKIN, ALSO PROVIDES A PERMANENT METHOD OF IDENTIFYING OBJECTS IN THE OPERATING ROOM: Brand: WRITESITE® REGULAR TIP SKIN MARKER

## (undated) DEVICE — BNDR ABD PREMIUM/UNIV 10IN 27TO48IN

## (undated) DEVICE — CLTH CLENS READYCLEANSE PERI CARE PK/5

## (undated) DEVICE — BLAKE SILICONE DRAIN, 15 FR ROUND, HUBLESS WITH 3/16" TROCAR: Brand: BLAKE

## (undated) DEVICE — SPNG GZ WOVN 4X4IN 12PLY 10/BX STRL